# Patient Record
Sex: MALE | Race: WHITE | Employment: UNEMPLOYED | URBAN - METROPOLITAN AREA
[De-identification: names, ages, dates, MRNs, and addresses within clinical notes are randomized per-mention and may not be internally consistent; named-entity substitution may affect disease eponyms.]

---

## 2020-08-11 ENCOUNTER — OFFICE VISIT (OUTPATIENT)
Dept: CARDIOLOGY CLINIC | Age: 60
End: 2020-08-11
Payer: MEDICARE

## 2020-08-11 VITALS
HEART RATE: 74 BPM | SYSTOLIC BLOOD PRESSURE: 128 MMHG | RESPIRATION RATE: 26 BRPM | OXYGEN SATURATION: 99 % | DIASTOLIC BLOOD PRESSURE: 84 MMHG

## 2020-08-11 PROBLEM — R93.1 ABNORMAL ECHOCARDIOGRAM: Status: ACTIVE | Noted: 2020-08-11

## 2020-08-11 PROCEDURE — 99214 OFFICE O/P EST MOD 30 MIN: CPT | Performed by: INTERNAL MEDICINE

## 2020-08-11 PROCEDURE — G8421 BMI NOT CALCULATED: HCPCS | Performed by: INTERNAL MEDICINE

## 2020-08-11 PROCEDURE — G8427 DOCREV CUR MEDS BY ELIG CLIN: HCPCS | Performed by: INTERNAL MEDICINE

## 2020-08-11 RX ORDER — FUROSEMIDE 20 MG/1
40 TABLET ORAL 2 TIMES DAILY
COMMUNITY

## 2020-08-11 RX ORDER — SULFAMETHOXAZOLE AND TRIMETHOPRIM 800; 160 MG/1; MG/1
TABLET ORAL
COMMUNITY
Start: 2020-04-03 | End: 2020-10-01

## 2020-08-11 RX ORDER — PREDNISONE 20 MG/1
TABLET ORAL
COMMUNITY
Start: 2020-04-09 | End: 2020-09-12

## 2020-08-11 RX ORDER — FLUOXETINE HYDROCHLORIDE 40 MG/1
CAPSULE ORAL
COMMUNITY
Start: 2020-06-09

## 2020-08-11 RX ORDER — LOSARTAN POTASSIUM 100 MG/1
100 TABLET ORAL DAILY
COMMUNITY

## 2020-08-11 RX ORDER — ATORVASTATIN CALCIUM 20 MG/1
20 TABLET, FILM COATED ORAL DAILY
COMMUNITY
Start: 2020-06-29

## 2020-08-11 RX ORDER — METOPROLOL SUCCINATE 50 MG/1
TABLET, EXTENDED RELEASE ORAL
COMMUNITY
Start: 2020-06-29 | End: 2020-10-01

## 2020-08-11 RX ORDER — FOLIC ACID 1 MG/1
1000 TABLET ORAL DAILY
COMMUNITY
Start: 2019-08-09

## 2020-08-11 RX ORDER — METOPROLOL TARTRATE 50 MG/1
50 TABLET, FILM COATED ORAL 2 TIMES DAILY
COMMUNITY

## 2020-08-11 RX ORDER — SPIRONOLACTONE 50 MG/1
50 TABLET, FILM COATED ORAL DAILY
COMMUNITY

## 2020-08-11 RX ORDER — ASPIRIN 81 MG/1
81 TABLET ORAL DAILY
COMMUNITY
Start: 2019-08-09

## 2020-08-11 RX ORDER — FLUOXETINE HYDROCHLORIDE 20 MG/1
CAPSULE ORAL
COMMUNITY
Start: 2020-06-09

## 2020-08-11 ASSESSMENT — ENCOUNTER SYMPTOMS
COLOR CHANGE: 0
BLOOD IN STOOL: 0
SHORTNESS OF BREATH: 1
APNEA: 0
CHEST TIGHTNESS: 0
ABDOMINAL DISTENTION: 0
NAUSEA: 0
DIARRHEA: 0
WHEEZING: 1
VOMITING: 0

## 2020-08-11 NOTE — PROGRESS NOTES
[facility] CARDIOLOGY OFFICE CONSULT        Patient: Eli Hamilton  YOB: 1960  MRN: [id number]    Chief Complaint:  Chief Complaint   Patient presents with   Julieta Hurd Cardiologist     Referred by Dr. Gaby Covarrubias Abnormal Test Results     ABN ECHO       Subjective/HPI:   8/11/2020: Patient presents today for shortness of breath on minimal exertion. He is a resident at [address] [place] [address]. Has a longstanding history of alcohol use. Quit 6 months ago. Has a history of varices. Also recent echo showed EF fraction about 25% he has severe pulmonary hypertension. Was a smoker also until 6 months ago. He is functional class III CHF. He is on O2 24/7 but noncompliant. Still smokes 4/ 6 cigarettes a day. Denies alcohol. Denies illicit drug abuse. Physical examination consistent with congestive heart failure. I told him his most likely ED he has alcoholic cardiomyopathy. He sees  in [place]. Currently under [facility]. I am going to do the following. He refuses to be admitted. I am going to start him on Lasix 40 mg p.o. twice daily, Aldactone 25 mg a day, Lopressor 25 mg p.o. twice daily, losartan 50 mg daily. Needs to be on 3 L nasal oxygen 24/7. I would like him to be on Entresto but cannot do it right now. His renal function was normal.  Because he does not want to be admitted and wants to stay in [place]. I will see him next week the [place] office. Will do labs at that time. He has been on steroids for the last 6 months for \" pulmonary fibrosis\"            No past medical history on file. Past Surgical History:   Procedure Laterality Date    CARDIAC CATHETERIZATION  03/06/2019    DR. VARNER       No family history on file.     Social History     Socioeconomic History    Marital status: None     Spouse name: None    Number of children: None    Years of education: None    Highest education level: None   Occupational History    None   Social Needs    Financial resource strain: None    Food insecurity     Worry: None     Inability: None    Transportation needs     Medical: None     Non-medical: None   Tobacco Use    Smoking status: Current Some Day Smoker    Smokeless tobacco: Never Used    Tobacco comment: 3 small puff of cig a day   Substance and Sexual Activity    Alcohol use: Not Currently     Comment: Since March 2020    Drug use: Never    Sexual activity: Not Currently   Lifestyle    Physical activity     Days per week: None     Minutes per session: None    Stress: None   Relationships    Social connections     Talks on phone: None     Gets together: None     Attends Mandaen service: None     Active member of club or organization: None     Attends meetings of clubs or organizations: None     Relationship status: None    Intimate partner violence     Fear of current or ex partner: None     Emotionally abused: None     Physically abused: None     Forced sexual activity: None   Other Topics Concern    None   Social History Narrative    None       No Known Allergies    Current Outpatient Medications   Medication Sig Dispense Refill    aspirin 81 MG EC tablet Take 81 mg by mouth daily      atorvastatin (LIPITOR) 80 MG tablet       sulfamethoxazole-trimethoprim (BACTRIM DS;SEPTRA DS) 800-160 MG per tablet       FLUoxetine (PROZAC) 40 MG capsule TAKE 1 CAPSULE BY MOUTH ONCE DAILY      folic acid (FOLVITE) 1 MG tablet Take 1,000 mcg by mouth daily      FLUoxetine (PROZAC) 20 MG capsule TAKE 1 CAPSULE BY MOUTH ONCE DAILY      metoprolol succinate (TOPROL XL) 50 MG extended release tablet       predniSONE (DELTASONE) 20 MG tablet       THIAMINE MONONITRATE PO Take 1 tablet by mouth daily      furosemide (LASIX) 20 MG tablet Take 20 mg by mouth 2 times daily      spironolactone (ALDACTONE) 25 MG tablet Take 25 mg by mouth daily      metoprolol tartrate (LOPRESSOR) 25 MG tablet Take 25 mg by mouth 2 times daily      losartan (COZAAR) 50 MG tablet Take 50 mg by mouth daily       No current facility-administered medications for this visit. Review of Systems:   Review of Systems   Constitutional: Negative for appetite change, diaphoresis and fatigue. HENT: Negative for nosebleeds. Respiratory: Positive for shortness of breath and wheezing. Negative for apnea and chest tightness. Cardiovascular: Negative for chest pain, palpitations and leg swelling. Gastrointestinal: Negative for abdominal distention, blood in stool, diarrhea, nausea and vomiting. Musculoskeletal: Negative for myalgias, neck pain and neck stiffness. Skin: Negative for color change, pallor, rash and wound. Neurological: Negative for dizziness, seizures, syncope, weakness, light-headedness, numbness and headaches. Hematological: Does not bruise/bleed easily. Psychiatric/Behavioral: Negative for agitation, behavioral problems and confusion. The patient is not nervous/anxious and is not hyperactive. All other systems reviewed and are negative. Review of System is negative except for as mentioned above. Physical Examination:    /84 (Site: Right Upper Arm, Position: Sitting, Cuff Size: Medium Adult)   Pulse 74   Resp 26   SpO2 99%    Physical Exam   Constitutional: No distress. Chronically ill looking   HENT:   Nose: Nose normal.   Mouth/Throat: Dentition is normal. Oropharynx is clear. Eyes: Pupils are equal, round, and reactive to light. Conjunctivae are normal.   Neck: Normal range of motion and thyroid normal. Neck supple. Cardiovascular: Regular rhythm, S1 normal, S2 normal, intact distal pulses and normal pulses. Tachycardia present. PMI is not displaced. Exam reveals gallop, S3 and S4. No murmur heard. Pulmonary/Chest: Increased effort noted. He has bibasilar rales and scattered wheezes. He exhibits no tenderness. Abdominal: Soft. Bowel sounds are normal. He exhibits no distension and no mass.  There is no splenomegaly or hepatomegaly. There is no abdominal tenderness. No hernia. Neurological: He is alert and oriented to person, place, and time. He has normal motor skills. Gait normal.   Skin: Skin is warm and dry. No cyanosis. No jaundice. Nails show no clubbing. There is no problem list on file for this patient. No orders of the defined types were placed in this encounter. No orders of the defined types were placed in this encounter. Assessment:    There are no diagnoses linked to this encounter. Plan:   Patient to start Lasix 40mg Twice a day, Aldactone 25mg Daily, Lopressor 25mg Twice a day & Losartan 50mg Daily    Stay on same medications. See me in 1 week. This note was partially generated using Dragon voice recognition system, and there may be some incorrect words, spellings, punctuation that were not noticed in checking the note before saving.         Electronically signed by Haydee Valerio MD on 8/11/2020 at 5:10 PM

## 2020-08-18 ENCOUNTER — TELEPHONE (OUTPATIENT)
Dept: CARDIOLOGY CLINIC | Age: 60
End: 2020-08-18

## 2020-08-18 ENCOUNTER — OFFICE VISIT (OUTPATIENT)
Dept: CARDIOLOGY CLINIC | Age: 60
End: 2020-08-18
Payer: MEDICARE

## 2020-08-18 VITALS
RESPIRATION RATE: 22 BRPM | HEART RATE: 80 BPM | SYSTOLIC BLOOD PRESSURE: 122 MMHG | DIASTOLIC BLOOD PRESSURE: 82 MMHG | OXYGEN SATURATION: 99 %

## 2020-08-18 PROCEDURE — G8421 BMI NOT CALCULATED: HCPCS | Performed by: INTERNAL MEDICINE

## 2020-08-18 PROCEDURE — 99214 OFFICE O/P EST MOD 30 MIN: CPT | Performed by: INTERNAL MEDICINE

## 2020-08-18 PROCEDURE — 4004F PT TOBACCO SCREEN RCVD TLK: CPT | Performed by: INTERNAL MEDICINE

## 2020-08-18 PROCEDURE — G8427 DOCREV CUR MEDS BY ELIG CLIN: HCPCS | Performed by: INTERNAL MEDICINE

## 2020-08-18 PROCEDURE — 3017F COLORECTAL CA SCREEN DOC REV: CPT | Performed by: INTERNAL MEDICINE

## 2020-08-18 ASSESSMENT — ENCOUNTER SYMPTOMS
SHORTNESS OF BREATH: 0
BLOOD IN STOOL: 0
COLOR CHANGE: 0
VOMITING: 0
NAUSEA: 0
CHEST TIGHTNESS: 0
ABDOMINAL DISTENTION: 0
DIARRHEA: 0
APNEA: 0

## 2020-08-18 NOTE — TELEPHONE ENCOUNTER
Please call Anjali Mullins at the [facility] to discuss Losartan and Lipitor medication changes. States he was taking Losartan 50mg and note states to increase to 200mg and also lipitor order states to decrease to 20mg and he is currently taking 20mg. Medication list scanned in shows lipitor 80mg. Needs clarification.

## 2020-08-18 NOTE — PROGRESS NOTES
[facility] CARDIOLOGY OFFICE FOLLOW-UP      Patient: Jacki Munguia  YOB: 1960  MRN: [id number]    Chief Complaint:  Chief Complaint   Patient presents with    Follow-up     1 week f/u    Abnormal Test Results     ABN ECHO - seen last week 8/11/2020    Medication Check     Lasix 20mg Daily / Aldactone 25mg Daily / Lopressor 25mg Daily / Cozaar 50mg Daily         Subjective/HPI:  8/18/2020: Patient presents today for cardiomyopathy severe pulmonary hypertension. He is in assisted living. Heart rate is better. He is more shaky. Has history of alcohol use with cirrhosis of the liver. He is on metoprolol 25 twice daily will be increased to 50 twice daily, leave the Lasix at 40 twice daily, Aldactone will be increased to 50. And increase the losartan 200. See me next week again he does not want to get admitted. Recent labs showed hemoglobin A1c of 6.5 BUN and creatinine is stable       8/11/2020: Patient presents today for shortness of breath on minimal exertion. He is a resident at [address] [place] [address]. Has a longstanding history of alcohol use. Quit 6 months ago. Has a history of varices. Also recent echo showed EF fraction about 25% he has severe pulmonary hypertension. Was a smoker also until 6 months ago. He is functional class III CHF. He is on O2 24/7 but noncompliant. Still smokes 4/ 6 cigarettes a day. Denies alcohol. Denies illicit drug abuse. Physical examination consistent with congestive heart failure. I told him his most likely ED he has alcoholic cardiomyopathy. He sees  in [place]. [facility] I am going to do the following. He refuses to be admitted. I am going to start him on Lasix 40 mg p.o. twice daily, Aldactone 25 mg a day, Lopressor 25 mg p.o. twice daily, losartan 50 mg daily. Needs to be on 3 L nasal oxygen 24/7. I would like him to be on Entresto but cannot do it right now.   His renal function was normal.  Because he does not want to be admitted and wants to stay in [place]. I will see him next week the [place] office. Will do labs at that time. He has been on steroids for the last 6 months for \" pulmonary fibrosis\"        No past medical history on file. Past Surgical History:   Procedure Laterality Date    CARDIAC CATHETERIZATION  03/06/2019    DR. VARNER       No family history on file.     Social History     Socioeconomic History    Marital status: None     Spouse name: None    Number of children: None    Years of education: None    Highest education level: None   Occupational History    None   Social Needs    Financial resource strain: None    Food insecurity     Worry: None     Inability: None    Transportation needs     Medical: None     Non-medical: None   Tobacco Use    Smoking status: Current Some Day Smoker    Smokeless tobacco: Never Used    Tobacco comment: 3 small puff of cig a day   Substance and Sexual Activity    Alcohol use: Not Currently     Comment: Since March 2020    Drug use: Never    Sexual activity: Not Currently   Lifestyle    Physical activity     Days per week: None     Minutes per session: None    Stress: None   Relationships    Social connections     Talks on phone: None     Gets together: None     Attends Scientologist service: None     Active member of club or organization: None     Attends meetings of clubs or organizations: None     Relationship status: None    Intimate partner violence     Fear of current or ex partner: None     Emotionally abused: None     Physically abused: None     Forced sexual activity: None   Other Topics Concern    None   Social History Narrative    None       No Known Allergies    Current Outpatient Medications   Medication Sig Dispense Refill    aspirin 81 MG EC tablet Take 81 mg by mouth daily      atorvastatin (LIPITOR) 80 MG tablet       sulfamethoxazole-trimethoprim (BACTRIM DS;SEPTRA DS) 800-160 MG per tablet       FLUoxetine (PROZAC) 40 MG capsule TAKE 1 CAPSULE BY MOUTH ONCE DAILY      folic acid (FOLVITE) 1 MG tablet Take 1,000 mcg by mouth daily      FLUoxetine (PROZAC) 20 MG capsule TAKE 1 CAPSULE BY MOUTH ONCE DAILY      metoprolol succinate (TOPROL XL) 50 MG extended release tablet       predniSONE (DELTASONE) 20 MG tablet       THIAMINE MONONITRATE PO Take 1 tablet by mouth daily      furosemide (LASIX) 20 MG tablet Take 40 mg by mouth 2 times daily       spironolactone (ALDACTONE) 25 MG tablet Take 25 mg by mouth daily      metoprolol tartrate (LOPRESSOR) 25 MG tablet Take 25 mg by mouth 2 times daily      losartan (COZAAR) 50 MG tablet Take 50 mg by mouth daily       No current facility-administered medications for this visit. Review of Systems:   Review of Systems   Constitutional: Negative for appetite change, diaphoresis and fatigue. HENT: Negative for nosebleeds. Respiratory: Negative for apnea, chest tightness and shortness of breath. Cardiovascular: Negative for chest pain, palpitations and leg swelling. Gastrointestinal: Negative for abdominal distention, blood in stool, diarrhea, nausea and vomiting. Musculoskeletal: Negative for myalgias, neck pain and neck stiffness. Skin: Negative for color change, pallor, rash and wound. Neurological: Negative for dizziness, seizures, syncope, weakness, light-headedness, numbness and headaches. Hematological: Does not bruise/bleed easily. Psychiatric/Behavioral: Negative for agitation, behavioral problems and confusion. The patient is not nervous/anxious and is not hyperactive. All other systems reviewed and are negative. Review of System is negative except for as mentioned above. Physical Examination:    /82 (Site: Left Upper Arm, Position: Sitting, Cuff Size: Medium Adult)   Pulse 80   Resp 22   SpO2 99%    Physical Exam   Constitutional: He appears healthy. No distress.    HENT:   Nose: Nose normal.   Mouth/Throat: Dentition is normal. Oropharynx is clear. Eyes: Pupils are equal, round, and reactive to light. Conjunctivae are normal.   Neck: Normal range of motion and thyroid normal. Neck supple. Cardiovascular: Regular rhythm, S1 normal, S2 normal, normal heart sounds, intact distal pulses and normal pulses. PMI is not displaced. No murmur heard. Pulmonary/Chest: He has no wheezes. He has no rales. He exhibits no tenderness. Abdominal: Soft. Bowel sounds are normal. He exhibits no distension and no mass. There is no splenomegaly or hepatomegaly. There is no abdominal tenderness. No hernia. Neurological: He is alert and oriented to person, place, and time. He has normal motor skills. Gait normal.   Skin: Skin is warm and dry. No cyanosis. No jaundice. Nails show no clubbing. Patient Active Problem List   Diagnosis    Abnormal echocardiogram           No orders of the defined types were placed in this encounter. No orders of the defined types were placed in this encounter. Assessment:    1. Cardiomyopathy, unspecified type ([address]    2. Pulmonary hypertension ([address]         Plan:     Stay on same medications. See me in 1 week. This note was partially generated using Dragon voice recognition system, and there may be some incorrect words, spellings, punctuation that were not noticed in checking the note before saving.         Electronically signed by Kaye Collazo MD on 8/19/2020 at 8:17 AM

## 2020-08-19 ENCOUNTER — TELEPHONE (OUTPATIENT)
Dept: CARDIOLOGY CLINIC | Age: 60
End: 2020-08-19

## 2020-08-19 NOTE — TELEPHONE ENCOUNTER
LENORE IN [place]---ALREADY ON LIPITOR 20MG --OUR MED LIST SAYS 80 MG.   PLEASE CLARIFY  CALL RN @ [employer]  [phone]

## 2020-08-20 NOTE — TELEPHONE ENCOUNTER
CALLED [facility] - SPOKE WITH NURSE IN CHARGE - VERIFIED MEDICATION DOSAGES PER DR. Ken Mcmillan

## 2020-09-08 ENCOUNTER — OFFICE VISIT (OUTPATIENT)
Dept: CARDIOLOGY CLINIC | Age: 60
End: 2020-09-08
Payer: MEDICARE

## 2020-09-08 VITALS
OXYGEN SATURATION: 99 % | DIASTOLIC BLOOD PRESSURE: 68 MMHG | SYSTOLIC BLOOD PRESSURE: 98 MMHG | RESPIRATION RATE: 22 BRPM | HEART RATE: 78 BPM

## 2020-09-08 PROCEDURE — G8427 DOCREV CUR MEDS BY ELIG CLIN: HCPCS | Performed by: INTERNAL MEDICINE

## 2020-09-08 PROCEDURE — 99214 OFFICE O/P EST MOD 30 MIN: CPT | Performed by: INTERNAL MEDICINE

## 2020-09-08 PROCEDURE — G8421 BMI NOT CALCULATED: HCPCS | Performed by: INTERNAL MEDICINE

## 2020-09-08 PROCEDURE — 4004F PT TOBACCO SCREEN RCVD TLK: CPT | Performed by: INTERNAL MEDICINE

## 2020-09-08 PROCEDURE — 3017F COLORECTAL CA SCREEN DOC REV: CPT | Performed by: INTERNAL MEDICINE

## 2020-09-08 ASSESSMENT — ENCOUNTER SYMPTOMS
SHORTNESS OF BREATH: 0
NAUSEA: 0
BLOOD IN STOOL: 0
CHEST TIGHTNESS: 0
WHEEZING: 0
DIARRHEA: 0
VOMITING: 0
APNEA: 0
COUGH: 0
STRIDOR: 0

## 2020-09-08 NOTE — PROGRESS NOTES
[facility] CARDIOLOGY OFFICE FOLLOW-UP      Patient: Meghan Stephens  YOB: 1960  MRN: [id number]    Chief Complaint:  Chief Complaint   Patient presents with    Follow-up     1 week f/u    Cardiomyopathy         Subjective/HPI:  9/8/2020: Patient presents today for follow-up of cardiomyopathy. Clinically he appears to be better. He refuses to accept that he does not want to be admitted. Occasional chest pain which may be angina. I am going to increase the Lopressor to 50 twice daily with parameters for blood pressure and heart rate. We will see him in the month. No definite ankle edema. He has functional class III heart failure symptoms. Again refuses admission             No past medical history on file. Past Surgical History:   Procedure Laterality Date    CARDIAC CATHETERIZATION  03/06/2019    DR. VARNER       No family history on file.     Social History     Socioeconomic History    Marital status: Single     Spouse name: None    Number of children: None    Years of education: None    Highest education level: None   Occupational History    None   Social Needs    Financial resource strain: None    Food insecurity     Worry: None     Inability: None    Transportation needs     Medical: None     Non-medical: None   Tobacco Use    Smoking status: Current Some Day Smoker    Smokeless tobacco: Never Used    Tobacco comment: 3 small puff of cig a day   Substance and Sexual Activity    Alcohol use: Not Currently     Comment: Since March 2020    Drug use: Never    Sexual activity: Not Currently   Lifestyle    Physical activity     Days per week: None     Minutes per session: None    Stress: None   Relationships    Social connections     Talks on phone: None     Gets together: None     Attends Anglican service: None     Active member of club or organization: None     Attends meetings of clubs or organizations: None     Relationship status: None    Intimate partner violence All other systems reviewed and are negative. Review of System is negative except for as mentioned above. Physical Examination:    BP 98/68 (Site: Left Upper Arm, Position: Sitting, Cuff Size: Medium Adult)   Pulse 78   Resp 22   SpO2 99%    Physical Exam   Constitutional: He appears healthy. No distress. HENT:   Nose: Nose normal.   Mouth/Throat: Dentition is normal. Oropharynx is clear. Eyes: Pupils are equal, round, and reactive to light. Conjunctivae are normal.   Neck: Normal range of motion and thyroid normal. Neck supple. Cardiovascular: Regular rhythm, S1 normal, S2 normal, intact distal pulses and normal pulses. PMI is not displaced. Exam reveals gallop. No murmur heard. Pulmonary/Chest: He has no wheezes. He has no rales. He exhibits no tenderness. Abdominal: Soft. Bowel sounds are normal. He exhibits no distension and no mass. There is no splenomegaly or hepatomegaly. There is no abdominal tenderness. No hernia. Neurological: He is alert and oriented to person, place, and time. He has normal motor skills. Gait normal.   Skin: Skin is warm and dry. No cyanosis. No jaundice. Nails show no clubbing. Patient Active Problem List   Diagnosis    Abnormal echocardiogram           No orders of the defined types were placed in this encounter. No orders of the defined types were placed in this encounter. Assessment:    1. Cardiomyopathy, unspecified type ([address]    2. Abnormal echocardiogram    3. H/O ETOH abuse         Plan:   Patient to have Lopressor 50mg Twice a day (orders sent to nursing home)    Stay on same medications. See me in 1 month. This note was partially generated using Dragon voice recognition system, and there may be some incorrect words, spellings, punctuation that were not noticed in checking the note before saving.         Electronically signed by Xochilt Kaufman MD on 9/8/2020 at 3:07 PM

## 2020-09-12 ENCOUNTER — APPOINTMENT (OUTPATIENT)
Dept: GENERAL RADIOLOGY | Age: 60
End: 2020-09-12
Payer: MEDICARE

## 2020-09-12 ENCOUNTER — HOSPITAL ENCOUNTER (EMERGENCY)
Age: 60
Discharge: HOME | End: 2020-09-12
Payer: MEDICARE

## 2020-09-12 VITALS
BODY MASS INDEX: 31.52 KG/M2 | WEIGHT: 208 LBS | HEIGHT: 68 IN | TEMPERATURE: 98.2 F | RESPIRATION RATE: 20 BRPM | SYSTOLIC BLOOD PRESSURE: 106 MMHG | HEART RATE: 83 BPM | OXYGEN SATURATION: 97 % | DIASTOLIC BLOOD PRESSURE: 68 MMHG

## 2020-09-12 LAB
ALBUMIN SERPL-MCNC: 3.9 G/DL (ref 3.5–4.6)
ALP BLD-CCNC: 58 U/L (ref 35–104)
ALT SERPL-CCNC: 21 U/L (ref 0–41)
ANION GAP SERPL CALCULATED.3IONS-SCNC: 9 MEQ/L (ref 9–15)
AST SERPL-CCNC: 22 U/L (ref 0–40)
BASOPHILS ABSOLUTE: 0.1 K/UL (ref 0–0.2)
BASOPHILS RELATIVE PERCENT: 0.6 %
BILIRUB SERPL-MCNC: 0.4 MG/DL (ref 0.2–0.7)
BUN BLDV-MCNC: 50 MG/DL (ref 8–23)
CALCIUM SERPL-MCNC: 9.9 MG/DL (ref 8.5–9.9)
CHLORIDE BLD-SCNC: 94 MEQ/L (ref 95–107)
CO2: 30 MEQ/L (ref 20–31)
CREAT SERPL-MCNC: 1.76 MG/DL (ref 0.7–1.2)
EKG ATRIAL RATE: 79 BPM
EKG P AXIS: 45 DEGREES
EKG P-R INTERVAL: 128 MS
EKG Q-T INTERVAL: 378 MS
EKG QRS DURATION: 96 MS
EKG QTC CALCULATION (BAZETT): 433 MS
EKG R AXIS: 71 DEGREES
EKG T AXIS: 23 DEGREES
EKG VENTRICULAR RATE: 79 BPM
EOSINOPHILS ABSOLUTE: 0.7 K/UL (ref 0–0.7)
EOSINOPHILS RELATIVE PERCENT: 5.9 %
GFR AFRICAN AMERICAN: 47.9
GFR NON-AFRICAN AMERICAN: 39.6
GLOBULIN: 3.5 G/DL (ref 2.3–3.5)
GLUCOSE BLD-MCNC: 126 MG/DL (ref 70–99)
HCT VFR BLD CALC: 36.2 % (ref 42–52)
HEMOGLOBIN: 12.7 G/DL (ref 14–18)
INR BLD: 1
LYMPHOCYTES ABSOLUTE: 2.3 K/UL (ref 1–4.8)
LYMPHOCYTES RELATIVE PERCENT: 20.2 %
MCH RBC QN AUTO: 31.9 PG (ref 27–31.3)
MCHC RBC AUTO-ENTMCNC: 35 % (ref 33–37)
MCV RBC AUTO: 91 FL (ref 80–100)
MONOCYTES ABSOLUTE: 0.9 K/UL (ref 0.2–0.8)
MONOCYTES RELATIVE PERCENT: 7.6 %
NEUTROPHILS ABSOLUTE: 7.4 K/UL (ref 1.4–6.5)
NEUTROPHILS RELATIVE PERCENT: 65.7 %
PDW BLD-RTO: 12.8 % (ref 11.5–14.5)
PLATELET # BLD: 221 K/UL (ref 130–400)
POTASSIUM SERPL-SCNC: 4.8 MEQ/L (ref 3.4–4.9)
PRO-BNP: 60 PG/ML
PROTHROMBIN TIME: 13.2 SEC (ref 12.3–14.9)
RBC # BLD: 3.98 M/UL (ref 4.7–6.1)
SODIUM BLD-SCNC: 133 MEQ/L (ref 135–144)
TOTAL PROTEIN: 7.4 G/DL (ref 6.3–8)
TROPONIN: <0.01 NG/ML (ref 0–0.01)
WBC # BLD: 11.2 K/UL (ref 4.8–10.8)

## 2020-09-12 PROCEDURE — 80053 COMPREHEN METABOLIC PANEL: CPT

## 2020-09-12 PROCEDURE — 93005 ELECTROCARDIOGRAM TRACING: CPT | Performed by: PERSONAL EMERGENCY RESPONSE ATTENDANT

## 2020-09-12 PROCEDURE — 83880 ASSAY OF NATRIURETIC PEPTIDE: CPT

## 2020-09-12 PROCEDURE — 84484 ASSAY OF TROPONIN QUANT: CPT

## 2020-09-12 PROCEDURE — 85025 COMPLETE CBC W/AUTO DIFF WBC: CPT

## 2020-09-12 PROCEDURE — 85610 PROTHROMBIN TIME: CPT

## 2020-09-12 PROCEDURE — 71045 X-RAY EXAM CHEST 1 VIEW: CPT

## 2020-09-12 PROCEDURE — 99285 EMERGENCY DEPT VISIT HI MDM: CPT

## 2020-09-12 RX ORDER — PREDNISONE 10 MG/1
TABLET ORAL
Qty: 30 TABLET | Refills: 0 | Status: SHIPPED | OUTPATIENT
Start: 2020-09-12 | End: 2020-09-22

## 2020-09-12 ASSESSMENT — ENCOUNTER SYMPTOMS
VOMITING: 0
BLOOD IN STOOL: 0
RHINORRHEA: 0
DIARRHEA: 0
SORE THROAT: 0
COUGH: 0
ABDOMINAL PAIN: 0
NAUSEA: 0
SHORTNESS OF BREATH: 1
COLOR CHANGE: 0

## 2020-09-12 NOTE — ED NOTES
Bed: 08  Expected date: 9/12/20  Expected time: 6:30 PM  Means of arrival: SCAD  Comments:  [id number] [address] coming in for code status change     Karol Munoz RN  09/12/20 [id number]

## 2020-09-12 NOTE — ED PROVIDER NOTES
[address] [facility] ED  eMERGENCY dEPARTMENT eNCOUnter      Pt Name: Misael Reyes  MRN: [id number]  [place] 1960  Date of evaluation: 9/12/2020  Provider: DILLON Westbrook      HISTORY OF PRESENT ILLNESS    Misael Reyes is a 61 y.o. male with PMHx of adrenal tumor, alcohol abuse, anemia, anxiety, BPH, depression, esophageal varices, fatty liver, alcoholic cardiomyopathy with ejection fraction 25%, hyperlipidemia, hypertension, peptic ulcer, pulmonary fibrosis 6 L continuous oxygen, pancytopenia, TIA presents to the emergency department with SOB and code status change. Patient was at [address] [place] [address] and discharged July 21. He was diagnosed with pulmonary fibrosis at this time. He went to [facility] and he said he didn't want to be on life support and they made him a DNR-CC. Patient states he never discussed options with him. He states now he cannot get assistance with his cardiomyopathy d/t his code status. Patient states for the past 3 months whenever standing up or with exertion he gets short of breath and lightheaded with near syncope. He denies orthopnea. He denies fevers, cough, chest pain (most recent chest pain was last week for 1 day), leg swelling, abdominal pain, nausea, vomiting. Patient smokes 2 cigarettes a day, stopped drinking 6+ months ago. HPI    Nursing Notes were reviewed. REVIEW OF SYSTEMS       Review of Systems   Constitutional: Negative for appetite change, chills and fever. HENT: Negative for congestion, rhinorrhea and sore throat. Respiratory: Positive for shortness of breath. Negative for cough. Cardiovascular: Negative for chest pain. Gastrointestinal: Negative for abdominal pain, blood in stool, diarrhea, nausea and vomiting. Genitourinary: Negative for difficulty urinating. Musculoskeletal: Negative for neck stiffness. Skin: Negative for color change and rash.    Neurological: Negative for dizziness, syncope, weakness, light-headedness, numbness and headaches. All other systems reviewed and are negative. PAST MEDICAL HISTORY     Past Medical History:   Diagnosis Date    Acidosis     Adrenal tumor     Alcohol abuse     Anemia     Anxiety     At high risk for self harm     Atherosclerotic heart disease     BPH (benign prostatic hyperplasia)     Depression     Esophageal varices (HCC)     Fatty liver     Heart failure (HCC)     Hyperlipidemia     Hypertension     Peptic ulcer     Pulmonary edema     Pulmonary fibrosis (HCC)     Thrombocytopenia (HCC)     TIA (transient ischemic attack)          SURGICAL HISTORY       Past Surgical History:   Procedure Laterality Date    APPENDECTOMY      CARDIAC CATHETERIZATION  03/06/2019    DR. VARNER    HERNIA REPAIR           CURRENT MEDICATIONS       Previous Medications    ASPIRIN 81 MG EC TABLET    Take 81 mg by mouth daily    ATORVASTATIN (LIPITOR) 80 MG TABLET        FLUOXETINE (PROZAC) 20 MG CAPSULE    TAKE 1 CAPSULE BY MOUTH ONCE DAILY    FLUOXETINE (PROZAC) 40 MG CAPSULE    TAKE 1 CAPSULE BY MOUTH ONCE DAILY    FOLIC ACID (FOLVITE) 1 MG TABLET    Take 1,000 mcg by mouth daily    FUROSEMIDE (LASIX) 20 MG TABLET    Take 40 mg by mouth 2 times daily     LOSARTAN (COZAAR) 50 MG TABLET    Take 50 mg by mouth daily    METOPROLOL SUCCINATE (TOPROL XL) 50 MG EXTENDED RELEASE TABLET        METOPROLOL TARTRATE (LOPRESSOR) 25 MG TABLET    Take 25 mg by mouth 2 times daily    SPIRONOLACTONE (ALDACTONE) 25 MG TABLET    Take 25 mg by mouth daily    SULFAMETHOXAZOLE-TRIMETHOPRIM (BACTRIM DS;SEPTRA DS) 800-160 MG PER TABLET        THIAMINE MONONITRATE PO    Take 1 tablet by mouth daily       ALLERGIES     Patient has no known allergies. FAMILY HISTORY     History reviewed. No pertinent family history.        SOCIAL HISTORY       Social History     Socioeconomic History    Marital status: Single     Spouse name: None    Number of children: None    Years of education: None    Highest education level: None   Occupational History    None   Social Needs    Financial resource strain: None    Food insecurity     Worry: None     Inability: None    Transportation needs     Medical: None     Non-medical: None   Tobacco Use    Smoking status: Current Some Day Smoker    Smokeless tobacco: Never Used    Tobacco comment: 3 small puff of cig a day   Substance and Sexual Activity    Alcohol use: Not Currently     Comment: Since March 2020    Drug use: Never    Sexual activity: Not Currently   Lifestyle    Physical activity     Days per week: None     Minutes per session: None    Stress: None   Relationships    Social connections     Talks on phone: None     Gets together: None     Attends Voodoo service: None     Active member of club or organization: None     Attends meetings of clubs or organizations: None     Relationship status: None    Intimate partner violence     Fear of current or ex partner: None     Emotionally abused: None     Physically abused: None     Forced sexual activity: None   Other Topics Concern    None   Social History Narrative    None         PHYSICAL EXAM         ED Triage Vitals   BP Temp Temp src Pulse Resp SpO2 Height Weight   09/12/20 1821 09/12/20 1812 -- 09/12/20 1821 09/12/20 1812 09/12/20 1812 09/12/20 1812 09/12/20 1812   106/68 98.2 °F (36.8 °C)  83 20 100 % 5' 8\" (1.727 m) 208 lb (94.3 kg)       Physical Exam  Constitutional:       Appearance: He is well-developed. HENT:      Head: Normocephalic and atraumatic. Eyes:      Conjunctiva/sclera: Conjunctivae normal.      Pupils: Pupils are equal, round, and reactive to light. Neck:      Musculoskeletal: Normal range of motion and neck supple. Trachea: No tracheal deviation. Cardiovascular:      Heart sounds: Normal heart sounds. Pulmonary:      Effort: Pulmonary effort is normal. No respiratory distress. Breath sounds: No stridor. Rales present.       Comments: Rales in bilateral lower lobes, no labored respirations  Abdominal:      General: Bowel sounds are normal. There is no distension. Palpations: Abdomen is soft. There is no mass. Tenderness: There is no abdominal tenderness. There is no guarding or rebound. Musculoskeletal: Normal range of motion. Skin:     General: Skin is warm and dry. Capillary Refill: Capillary refill takes less than 2 seconds. Findings: No rash. Neurological:      Mental Status: He is alert and oriented to person, place, and time. Deep Tendon Reflexes: Reflexes are normal and symmetric. Psychiatric:         Behavior: Behavior normal.         Thought Content:  Thought content normal.         Judgment: Judgment normal.         DIAGNOSTIC RESULTS     EKG:All EKG's are interpreted by the Emergency Department Physician who either signs or Co-signs this chart in the absence of a cardiologist.    Normal sinus rhythm, rate 79, normal intervals, normal axis, no ST segment changes    RADIOLOGY:   Non-plain film images such as CT, Ultrasound and MRI are read by theradiologist. Plain radiographic images are visualized and preliminarily interpreted by the emergency physician with the below findings:    Interpretation per theRadiologist below, if available at the time of this note:    XR CHEST PORTABLE    (Results Pending)           LABS:  Labs Reviewed   CBC WITH AUTO DIFFERENTIAL - Abnormal; Notable for the following components:       Result Value    WBC 11.2 (*)     RBC 3.98 (*)     Hemoglobin 12.7 (*)     Hematocrit 36.2 (*)     MCH 31.9 (*)     Neutrophils Absolute 7.4 (*)     Monocytes Absolute 0.9 (*)     All other components within normal limits   COMPREHENSIVE METABOLIC PANEL - Abnormal; Notable for the following components:    Sodium 133 (*)     Chloride 94 (*)     Glucose 126 (*)     BUN 50 (*)     CREATININE 1.76 (*)     GFR Non- 39.6 (*)     GFR  47.9 (*)     All other components within normal limits   BRAIN NATRIURETIC PEPTIDE   TROPONIN   PROTIME-INR       All other labs were within normal range or not returned as of this dictation. EMERGENCY DEPARTMENT COURSE and DIFFERENTIAL DIAGNOSIS/MDM:   Vitals:    Vitals:    09/12/20 1812 09/12/20 1821   BP:  106/68   Pulse:  83   Resp: 20    Temp: 98.2 °F (36.8 °C)    SpO2: 100% 97%   Weight: 208 lb (94.3 kg)    Height: 5' 8\" (1.727 m)          MDM    Chest x-ray is concerning for pulmonary fibrosis appearance, no pleural effusions. No previous for comparison. Last cardiac cath March 6 2019 per review of chart. Sodium 133, chloride 94, BUN 50, creatinine 1.76, WBC 11.2, hemoglobin 12.7. Patient has been asymptomatic throughout his visit. He does have appointment with pulmonology and cardiology in October. He is hemodynamically stable, no hypoxia, no labored respirations. Crackles noted in bilateral bases, consistent with pulmonary fibrosis. No leg swelling, denies orthopnea. Patient's CODE STATUS was changed to DNR CCA. Patient is alert and oriented and competent to make decisions. He states he does not want CPR or intubation. Patient was given a copy of his CODE STATUS, and it was also sent to the nursing home. He will be placed on prednisone. Standard anticipatory guidance given to patient upon discharge. Have given them a specific time frame in which to follow-up and who to follow-up with. I have also advised them that they should return to the emergency department if they get worse, or not getting better or develop any new or concerning symptoms. Patient demonstrates understanding. CRITICAL CARE TIME   Total Critical Caretime was 0 minutes, excluding separately reportable procedures. There was a high probability of clinically significant/life threatening deterioration in the patient's condition which required my urgent intervention. Procedures    FINAL IMPRESSION      1.  Pulmonary fibrosis (HCC)          DISPOSITION/PLAN   DISPOSITION Decision To Discharge 09/12/2020 08:17:45 PM      PATIENT REFERRED TO:  Follow-up with specialist as scheduled            DISCHARGE MEDICATIONS:  New Prescriptions    PREDNISONE (DELTASONE) 10 MG TABLET    5 tabs po qam for 2 days then 4,3,2,1 tabs qam for 2 days each total of 10 days          (Please notethat portions of this note were completed with a voice recognition program.  Efforts were made to edit the dictations but occasionally words are mis-transcribed. )    DILLON Clarke (electronically signed)  Emergency Physician Assistant         Asim Caldwell, [id number]  09/12/20 2021

## 2020-09-12 NOTE — ED TRIAGE NOTES
Patient here states he wants to change his code status. States he wants treatment for his problems. But is not actively having any issues. Patient is on 4-6 lt of o2 continuously lungs diminished.

## 2020-09-13 NOTE — ED NOTES
Patient is given D/C instructions. He verbalized understanding of all instructions. [facility] picked patient up and transferred patient over to cot and left facility with no incident.       Itzel Hernandez RN  09/12/20 2246

## 2020-09-15 PROCEDURE — 93010 ELECTROCARDIOGRAM REPORT: CPT | Performed by: INTERNAL MEDICINE

## 2020-10-01 ENCOUNTER — APPOINTMENT (OUTPATIENT)
Dept: GENERAL RADIOLOGY | Age: 60
End: 2020-10-01
Payer: MEDICARE

## 2020-10-01 ENCOUNTER — HOSPITAL ENCOUNTER (INPATIENT)
Age: 60
LOS: 3 days | Discharge: ANOTHER HOSPITAL | DRG: 196 | End: 2020-10-04
Attending: INTERNAL MEDICINE | Admitting: INTERNAL MEDICINE
Payer: MEDICARE

## 2020-10-01 ENCOUNTER — HOSPITAL ENCOUNTER (EMERGENCY)
Age: 60
Discharge: ANOTHER HOSPITAL | End: 2020-10-01
Attending: EMERGENCY MEDICINE
Payer: MEDICARE

## 2020-10-01 ENCOUNTER — APPOINTMENT (OUTPATIENT)
Dept: ULTRASOUND IMAGING | Age: 60
DRG: 196 | End: 2020-10-01
Attending: INTERNAL MEDICINE
Payer: MEDICARE

## 2020-10-01 VITALS
BODY MASS INDEX: 31.05 KG/M2 | WEIGHT: 204.2 LBS | SYSTOLIC BLOOD PRESSURE: 106 MMHG | RESPIRATION RATE: 28 BRPM | OXYGEN SATURATION: 94 % | HEART RATE: 103 BPM | DIASTOLIC BLOOD PRESSURE: 67 MMHG | TEMPERATURE: 98.9 F

## 2020-10-01 PROBLEM — J96.00 ACUTE RESPIRATORY FAILURE (HCC): Status: ACTIVE | Noted: 2020-10-01

## 2020-10-01 LAB
ALBUMIN SERPL-MCNC: 4.2 G/DL (ref 3.5–4.6)
ALP BLD-CCNC: 60 U/L (ref 35–104)
ALT SERPL-CCNC: 21 U/L (ref 0–41)
ANION GAP SERPL CALCULATED.3IONS-SCNC: 16 MEQ/L (ref 9–15)
APTT: 36.6 SEC (ref 24.4–36.8)
AST SERPL-CCNC: 22 U/L (ref 0–40)
BASE EXCESS ARTERIAL: 4
BASOPHILS ABSOLUTE: 0 K/UL (ref 0–0.2)
BASOPHILS RELATIVE PERCENT: 0.5 %
BILIRUB SERPL-MCNC: 1 MG/DL (ref 0.2–0.7)
BILIRUBIN URINE: NEGATIVE
BLOOD, URINE: NEGATIVE
BUN BLDV-MCNC: 38 MG/DL (ref 8–23)
CALCIUM SERPL-MCNC: 10.8 MG/DL (ref 8.5–9.9)
CHLORIDE BLD-SCNC: 92 MEQ/L (ref 95–107)
CLARITY: CLEAR
CO2: 29 MEQ/L (ref 20–31)
COLOR: YELLOW
CREAT SERPL-MCNC: 2.05 MG/DL (ref 0.7–1.2)
CREATININE URINE: 202.2 MG/DL
D DIMER: 0.31 MG/L FEU (ref 0–0.5)
EKG ATRIAL RATE: 113 BPM
EKG P AXIS: 48 DEGREES
EKG P-R INTERVAL: 120 MS
EKG Q-T INTERVAL: 338 MS
EKG QRS DURATION: 88 MS
EKG QTC CALCULATION (BAZETT): 463 MS
EKG R AXIS: 68 DEGREES
EKG T AXIS: 34 DEGREES
EKG VENTRICULAR RATE: 113 BPM
EOSINOPHILS ABSOLUTE: 0.4 K/UL (ref 0–0.7)
EOSINOPHILS RELATIVE PERCENT: 4.3 %
GFR AFRICAN AMERICAN: 40.2
GFR NON-AFRICAN AMERICAN: 33.2
GLOBULIN: 4.2 G/DL (ref 2.3–3.5)
GLUCOSE BLD-MCNC: 136 MG/DL (ref 70–99)
GLUCOSE URINE: NEGATIVE MG/DL
HCO3 ARTERIAL: 27.8 MMOL/L (ref 21–29)
HCT VFR BLD CALC: 38.9 % (ref 42–52)
HEMOGLOBIN: 13.4 G/DL (ref 14–18)
INR BLD: 1
KETONES, URINE: 15 MG/DL
LACTIC ACID: 2.7 MMOL/L (ref 0.5–2.2)
LEUKOCYTE ESTERASE, URINE: NEGATIVE
LYMPHOCYTES ABSOLUTE: 2.3 K/UL (ref 1–4.8)
LYMPHOCYTES RELATIVE PERCENT: 22.2 %
MCH RBC QN AUTO: 31.9 PG (ref 27–31.3)
MCHC RBC AUTO-ENTMCNC: 34.5 % (ref 33–37)
MCV RBC AUTO: 92.6 FL (ref 80–100)
MONOCYTES ABSOLUTE: 0.8 K/UL (ref 0.2–0.8)
MONOCYTES RELATIVE PERCENT: 8.3 %
NEUTROPHILS ABSOLUTE: 6.6 K/UL (ref 1.4–6.5)
NEUTROPHILS RELATIVE PERCENT: 64.7 %
NITRITE, URINE: NEGATIVE
O2 SAT, ARTERIAL: 98 % (ref 93–100)
PCO2 ARTERIAL: 40 MM HG (ref 35–45)
PDW BLD-RTO: 13.1 % (ref 11.5–14.5)
PERFORMED ON: ABNORMAL
PH ARTERIAL: 7.46 (ref 7.35–7.45)
PH UA: 5 (ref 5–9)
PLATELET # BLD: 228 K/UL (ref 130–400)
PO2 ARTERIAL: 104 MM HG (ref 75–108)
POC FIO2: 50
POC SAMPLE TYPE: ABNORMAL
POTASSIUM REFLEX MAGNESIUM: 3.9 MEQ/L (ref 3.4–4.9)
PRO-BNP: 137 PG/ML
PROTEIN UA: NEGATIVE MG/DL
PROTHROMBIN TIME: 13.5 SEC (ref 12.3–14.9)
RBC # BLD: 4.21 M/UL (ref 4.7–6.1)
SARS-COV-2, NAAT: NOT DETECTED
SARS-COV-2, NAAT: NOT DETECTED
SODIUM BLD-SCNC: 137 MEQ/L (ref 135–144)
SPECIFIC GRAVITY UA: 1.02 (ref 1–1.03)
TCO2 ARTERIAL: 29
TOTAL PROTEIN: 8.4 G/DL (ref 6.3–8)
TROPONIN: <0.01 NG/ML (ref 0–0.01)
UREA NITROGEN, UR: 1100 MG/DL
UROBILINOGEN, URINE: 0.2 E.U./DL
WBC # BLD: 10.2 K/UL (ref 4.8–10.8)

## 2020-10-01 PROCEDURE — 83880 ASSAY OF NATRIURETIC PEPTIDE: CPT

## 2020-10-01 PROCEDURE — 82570 ASSAY OF URINE CREATININE: CPT

## 2020-10-01 PROCEDURE — 85025 COMPLETE CBC W/AUTO DIFF WBC: CPT

## 2020-10-01 PROCEDURE — 6360000002 HC RX W HCPCS: Performed by: EMERGENCY MEDICINE

## 2020-10-01 PROCEDURE — 71045 X-RAY EXAM CHEST 1 VIEW: CPT

## 2020-10-01 PROCEDURE — 94640 AIRWAY INHALATION TREATMENT: CPT

## 2020-10-01 PROCEDURE — 94761 N-INVAS EAR/PLS OXIMETRY MLT: CPT

## 2020-10-01 PROCEDURE — 6370000000 HC RX 637 (ALT 250 FOR IP): Performed by: INTERNAL MEDICINE

## 2020-10-01 PROCEDURE — 36415 COLL VENOUS BLD VENIPUNCTURE: CPT

## 2020-10-01 PROCEDURE — 96365 THER/PROPH/DIAG IV INF INIT: CPT

## 2020-10-01 PROCEDURE — 94664 DEMO&/EVAL PT USE INHALER: CPT

## 2020-10-01 PROCEDURE — 85610 PROTHROMBIN TIME: CPT

## 2020-10-01 PROCEDURE — 2580000003 HC RX 258: Performed by: EMERGENCY MEDICINE

## 2020-10-01 PROCEDURE — 84484 ASSAY OF TROPONIN QUANT: CPT

## 2020-10-01 PROCEDURE — 36600 WITHDRAWAL OF ARTERIAL BLOOD: CPT

## 2020-10-01 PROCEDURE — 85730 THROMBOPLASTIN TIME PARTIAL: CPT

## 2020-10-01 PROCEDURE — U0002 COVID-19 LAB TEST NON-CDC: HCPCS

## 2020-10-01 PROCEDURE — 87040 BLOOD CULTURE FOR BACTERIA: CPT

## 2020-10-01 PROCEDURE — 83605 ASSAY OF LACTIC ACID: CPT

## 2020-10-01 PROCEDURE — 1210000000 HC MED SURG R&B

## 2020-10-01 PROCEDURE — 93005 ELECTROCARDIOGRAM TRACING: CPT

## 2020-10-01 PROCEDURE — 84540 ASSAY OF URINE/UREA-N: CPT

## 2020-10-01 PROCEDURE — 99283 EMERGENCY DEPT VISIT LOW MDM: CPT

## 2020-10-01 PROCEDURE — 96375 TX/PRO/DX INJ NEW DRUG ADDON: CPT

## 2020-10-01 PROCEDURE — 80053 COMPREHEN METABOLIC PANEL: CPT

## 2020-10-01 PROCEDURE — 99284 EMERGENCY DEPT VISIT MOD MDM: CPT

## 2020-10-01 PROCEDURE — 99223 1ST HOSP IP/OBS HIGH 75: CPT | Performed by: INTERNAL MEDICINE

## 2020-10-01 PROCEDURE — 76775 US EXAM ABDO BACK WALL LIM: CPT

## 2020-10-01 PROCEDURE — 82803 BLOOD GASES ANY COMBINATION: CPT

## 2020-10-01 PROCEDURE — 85379 FIBRIN DEGRADATION QUANT: CPT

## 2020-10-01 PROCEDURE — 81003 URINALYSIS AUTO W/O SCOPE: CPT

## 2020-10-01 RX ORDER — ALBUTEROL SULFATE 90 UG/1
2 AEROSOL, METERED RESPIRATORY (INHALATION)
Status: DISCONTINUED | OUTPATIENT
Start: 2020-10-01 | End: 2020-10-04 | Stop reason: HOSPADM

## 2020-10-01 RX ORDER — IPRATROPIUM BROMIDE AND ALBUTEROL SULFATE 2.5; .5 MG/3ML; MG/3ML
1 SOLUTION RESPIRATORY (INHALATION)
Status: DISCONTINUED | OUTPATIENT
Start: 2020-10-01 | End: 2020-10-01

## 2020-10-01 RX ORDER — POTASSIUM CHLORIDE 1.5 G/1.77G
10 POWDER, FOR SOLUTION ORAL DAILY
COMMUNITY

## 2020-10-01 RX ORDER — BUSPIRONE HYDROCHLORIDE 5 MG/1
5 TABLET ORAL 2 TIMES DAILY
COMMUNITY

## 2020-10-01 RX ORDER — FAMOTIDINE 20 MG/1
20 TABLET, FILM COATED ORAL 2 TIMES DAILY
Status: DISCONTINUED | OUTPATIENT
Start: 2020-10-01 | End: 2020-10-01 | Stop reason: DRUGHIGH

## 2020-10-01 RX ORDER — M-VIT,TX,IRON,MINS/CALC/FOLIC 27MG-0.4MG
1 TABLET ORAL DAILY
COMMUNITY

## 2020-10-01 RX ORDER — 0.9 % SODIUM CHLORIDE 0.9 %
500 INTRAVENOUS SOLUTION INTRAVENOUS ONCE
Status: COMPLETED | OUTPATIENT
Start: 2020-10-01 | End: 2020-10-01

## 2020-10-01 RX ORDER — LEVOFLOXACIN 5 MG/ML
500 INJECTION, SOLUTION INTRAVENOUS ONCE
Status: COMPLETED | OUTPATIENT
Start: 2020-10-01 | End: 2020-10-01

## 2020-10-01 RX ORDER — MAGNESIUM OXIDE 400 MG/1
400 TABLET ORAL DAILY
COMMUNITY

## 2020-10-01 RX ORDER — TAMSULOSIN HYDROCHLORIDE 0.4 MG/1
0.4 CAPSULE ORAL DAILY
COMMUNITY

## 2020-10-01 RX ORDER — LANOLIN ALCOHOL/MO/W.PET/CERES
3 CREAM (GRAM) TOPICAL NIGHTLY PRN
Status: DISCONTINUED | OUTPATIENT
Start: 2020-10-01 | End: 2020-10-04 | Stop reason: HOSPADM

## 2020-10-01 RX ORDER — ONDANSETRON 2 MG/ML
4 INJECTION INTRAMUSCULAR; INTRAVENOUS EVERY 6 HOURS PRN
Status: DISCONTINUED | OUTPATIENT
Start: 2020-10-01 | End: 2020-10-04 | Stop reason: HOSPADM

## 2020-10-01 RX ORDER — NINTEDANIB 150 MG/1
150 CAPSULE ORAL 2 TIMES DAILY
COMMUNITY

## 2020-10-01 RX ORDER — PANTOPRAZOLE SODIUM 40 MG/1
40 TABLET, DELAYED RELEASE ORAL DAILY
COMMUNITY

## 2020-10-01 RX ORDER — PROMETHAZINE HYDROCHLORIDE 12.5 MG/1
12.5 TABLET ORAL EVERY 6 HOURS PRN
Status: DISCONTINUED | OUTPATIENT
Start: 2020-10-01 | End: 2020-10-04 | Stop reason: HOSPADM

## 2020-10-01 RX ORDER — FAMOTIDINE 20 MG/1
20 TABLET, FILM COATED ORAL DAILY
Status: DISCONTINUED | OUTPATIENT
Start: 2020-10-01 | End: 2020-10-02

## 2020-10-01 RX ORDER — LORAZEPAM 0.5 MG/1
0.5 TABLET ORAL EVERY 8 HOURS PRN
COMMUNITY

## 2020-10-01 RX ORDER — METHYLPREDNISOLONE SODIUM SUCCINATE 125 MG/2ML
250 INJECTION, POWDER, LYOPHILIZED, FOR SOLUTION INTRAMUSCULAR; INTRAVENOUS ONCE
Status: COMPLETED | OUTPATIENT
Start: 2020-10-01 | End: 2020-10-01

## 2020-10-01 RX ORDER — NICOTINE 21 MG/24HR
1 PATCH, TRANSDERMAL 24 HOURS TRANSDERMAL EVERY 24 HOURS
COMMUNITY
Start: 2020-09-30 | End: 2020-11-11

## 2020-10-01 RX ORDER — PREDNISONE 10 MG/1
40 TABLET ORAL DAILY
Status: DISCONTINUED | OUTPATIENT
Start: 2020-10-01 | End: 2020-10-04

## 2020-10-01 RX ADMIN — ALBUTEROL SULFATE 2 PUFF: 90 AEROSOL, METERED RESPIRATORY (INHALATION) at 23:40

## 2020-10-01 RX ADMIN — Medication 3 MG: at 23:59

## 2020-10-01 RX ADMIN — PREDNISONE 40 MG: 10 TABLET ORAL at 07:36

## 2020-10-01 RX ADMIN — LEVOFLOXACIN 500 MG: 5 INJECTION, SOLUTION INTRAVENOUS at 03:21

## 2020-10-01 RX ADMIN — FAMOTIDINE 20 MG: 20 TABLET ORAL at 07:36

## 2020-10-01 RX ADMIN — METHYLPREDNISOLONE SODIUM SUCCINATE 250 MG: 125 INJECTION, POWDER, FOR SOLUTION INTRAMUSCULAR; INTRAVENOUS at 03:20

## 2020-10-01 RX ADMIN — SODIUM CHLORIDE 500 ML: 9 INJECTION, SOLUTION INTRAVENOUS at 04:05

## 2020-10-01 RX ADMIN — IPRATROPIUM BROMIDE AND ALBUTEROL 1 PUFF: 20; 100 SPRAY, METERED RESPIRATORY (INHALATION) at 13:55

## 2020-10-01 ASSESSMENT — PAIN SCALES - GENERAL
PAINLEVEL_OUTOF10: 0

## 2020-10-01 NOTE — ED NOTES
Transfer center called to initiate transfer to [facility], [address] [place] [address]  10/01/20 1789

## 2020-10-01 NOTE — CONSULTS
Critical Care Medicine  Consult Note      Reason for consultation: Acute on chronic hypoxic respiratory failure    HISTORY OF PRESENT ILLNESS:    This is a C3-year-old gentleman who was diagnosed with idiopathic pulmonary fibrosis several months ago and has been under the care of a pulmonologist on the [address], patient was on corticosteroid therapy initially which was being weaned off, he was just started on Ofev 3 weeks ago, he has been residing at the nursing home on 6 L of oxygen continuously, he has been having significant difficulty with any exertion just getting out of bed he was getting \"blackouts\" and falling. His oxygen saturation has been noted to be in the low 70s with any exertion. He was transported to the emergency department and from there was transferred to us intensive care unit due to respiratory distress and hypoxia. The time I saw him this morning during critical care rounds, he appeared fully alert awake very pleasant in no distress he is actually back on his usual 6 L of oxygen per nasal cannula with saturation up to 96% if completely idle but desaturates easily with movement and conversation into the low 90s or upper 80s. He denies any significant cough, sputum production, fevers, chills night sweats, hemoptysis, pleuritic pain or other significant additional symptoms. Patient is an active smoker but no established diagnosis of COPD in the past.  He had not been on bronchodilator therapy by his pulmonologist.  Unfortunately I do not have any of his records to review at this point, especially his x-rays or CTs at baseline.   His chest x-ray on admission here showed diffuse interstitial infiltrates with volume loss all suggestive of established severe pulmonary fibrosis          Past Medical History:        Diagnosis Date    Acidosis     Adrenal tumor     Alcohol abuse     Anemia     Anxiety     At high risk for self harm     Atherosclerotic heart disease     BPH (benign prostatic hyperplasia)     Depression     Esophageal varices (HCC)     Fatty liver     Heart failure (HCC)     Hyperlipidemia     Hypertension     Peptic ulcer     Pulmonary edema     Pulmonary fibrosis (HCC)     Thrombocytopenia (HCC)     TIA (transient ischemic attack)        Past Surgical History:        Procedure Laterality Date    APPENDECTOMY      CARDIAC CATHETERIZATION  03/06/2019    DR. VARNER    HERNIA REPAIR         Social History:     reports that he has been smoking. He has never used smokeless tobacco. He reports previous alcohol use. He reports that he does not use drugs. Family History:   No family history on file. Allergies:  Patient has no known allergies. MEDICATIONS during current hospitalization:    Continuous Infusions:    Scheduled Meds:   predniSONE  40 mg Oral Daily    famotidine  20 mg Oral Daily    albuterol-ipratropium  1 puff Inhalation TID       PRN Meds:promethazine **OR** ondansetron        REVIEW OF SYSTEMS:  As in history of present illness  Other 14 point review of system is negative. PHYSICAL EXAM:    Vitals:  BP (!) 154/80   Pulse 126   Temp 98.7 °F (37.1 °C) (Axillary)   Resp 24   Wt 206 lb 9.1 oz (93.7 kg)   SpO2 94%   BMI 31.41 kg/m²   General: Patient is currently alert, awake . comfortable in bed, No distress. Head: Atraumatic , Normocephalic   Eyes: PERRL. No icteric sclera. No conjunctival injection. No discharge   ENT: No nasal  discharge. Pharynx clear. Neck:  Trachea midline. No thyromegaly, no JVD, No cervical adenopathy. Chest : Adequate spontaneous resting effort, tachypneic with any exertion, symmetric bilateral excursions  Lung : Mildly decreased breath sounds bilaterally, inspiratory Velcro type crackles in the bases bilaterally  Heart[de-identified] Regular rhythm and rate. No mumur ,  Rub or gallop  ABD: Benign. Non-tender. Non-distended. No masses or organmegaly. Normal bowel sounds.    EXT: No significant pitting edema both lower extremities , No Cyanosis No clubbing  Neuro: no focal weakness  Skin: Warm and dry. No erythema or rash on exposed extremities. .      Data Review  Recent Labs     10/01/20  0256   WBC 10.2   HGB 13.4*   HCT 38.9*         Recent Labs     10/01/20  0256      K 3.9   CL 92*   CO2 29   BUN 38*   CREATININE 2.05*   GLUCOSE 136*       MV Settings: ABGs:   Recent Labs     10/01/20  0308   PHART 7.455*   QGT6SJR 40   PO2ART 104*   THY6OIB 27.8   BEART 4   T0IFVDBJ 98*   CPY2DMB 29     O2 Device: Other (Comment)(venti mask)  Lab Results   Component Value Date    LACTA 2.7 10/01/2020       Radiology  Xr Chest Portable    Result Date: 10/1/2020  XR CHEST PORTABLE : 10/1/2020 CLINICAL HISTORY:  sob . COMPARISON: 9/12/2020. TECHNIQUE: An upright portable AP radiograph of the chest was obtained. FINDINGS: Shallow inspiratory volumes are present with moderate predominantly peripheral coarsening of the bronchovascular structures suggestive of interstitial lung disease, not significantly changed from 9/12/2020. Superimposed interstitial edema, atypical pneumonia, or other less likely possibilities should be considered. There is no cardiomegaly, significant pleural effusion, gross vascular congestion, pneumothorax, or displaced fractures identified. STABLE CHEST FROM 9/12/2020 SUGGESTING MODERATE INTERSTITIAL LUNG DISEASE SUCH AS UIP. INTERSTITIAL EDEMA, ATYPICAL PNEUMONIA, OR OTHER LESS LIKELY POSSIBILITIES ARE ALSO CONSIDERATIONS. Xr Chest Portable    Result Date: 9/12/2020  EXAMINATION: CHEST PORTABLE VIEW  CLINICAL HISTORY: Short of breath COMPARISONS: None  FINDINGS: Single  views of the chest is submitted. The cardiac silhouette is enlarged . Pulmonary vascular congested with increased interstitial markings. Right sided trachea. There is patchy multifocal to coalescent airspace disease throughout the lung parenchyma. .  No Pneumothoraces. QUESTION OF PULMONARY VASCULAR CONGESTED WITH INCREASED INTERSTITIAL MARKINGS VERSUS CHRONIC INTERSTITIAL CHANGES. Marquise Verdugo QUESTION OF PATCHY MULTIFOCAL TO COALESCENT AIRSPACE DISEASE THROUGHOUT SUPERIMPOSED UPON THE LUNG PARENCHYMA. ... THE CONSTELLATION OF FINDINGS RECOMMEND CT SCAN THE CHEST WITHOUT IV CONTRAST TO FURTHER EVALUATE      Assessment, plan:   1. Severe idiopathic pulmonary fibrosis with an apparent slow progression, not unusual for this disease, on adequate therapy with corticosteroids, and recent initiation of Ofev. There is no clear evidence of acute exacerbation given patient's stability on his usual oxygen therapy noted at rest.  Exertional severe desaturations to the point of presyncopal symptoms, are not uncommon at this stage of pulmonary fibrosis. This will require additional oxygen set up for high flow, or liquid oxygen with mask, and inability to titrate, which may need a different set up than what the nursing home was able to provide  2. Chronic hypoxic respiratory failure with mild acute decompensation as mentioned above again patient will need adequate set up for oxygen supplement beyond 6 L  Would continue current treatment with prednisone at 40 mg daily as patient does not have clear evidence of acute flareup that would require high-dose or bolus dose IV steroids. Monitor oxygenation and titrate as needed, arrange for oxygen set up to provide more oxygen than 6 L to be used with any exertion. Comfort care and hospice care were discussed and strongly recommended at this stage as well.         Electronically signed by Irving Valdez MD, FCCP on 10/1/2020 at 11:47 AM

## 2020-10-01 NOTE — CARE COORDINATION
Per the case , the patient may not want to return to [facility]. Per Nuria Villanueva, the patient is a bed hold, long term. Also will need a palliative care order per the case . Electronically signed by PANCHTIO Ackerman on 10/1/20 at 4:33 PM EDT    The LSW talked to the patient by telephone. The patient verbally signed the IMM and expressed an understanding of the medicare appeal process. The patient states he was approved for medicaid as a secondary. The LSW left a message for Genaro Nazario, Help. The patient state his discharge plan is to return to [facility]. The patient states his family talked and he is agreeable to return to [facility].  Electronically signed by PANCHITO Ackerman on 10/1/20 at 4:37 PM EDT

## 2020-10-01 NOTE — PROGRESS NOTES
06:30am Assumed care of patient. Alert and oriented. VSS MP-ST. 02 40% venti mask. Lungs Diminshed to ausculatation. No skin issues meplex on. No complaints of  0800am External catheter placed per patient request.  Remains 40% venti mask. VSS  No complaints of resp distress at this time  09:40am Critical care rounds with DR Marsha Kramer and team  09:45am Father Amaya Paloma in to see patient  12 noon remains alert and oriented. VSS MP_ST. No complaints  1600pm remains alert and oriented.  No complaints  18:50pm Second covid and urine tests obtained and sent

## 2020-10-01 NOTE — ED PROVIDER NOTES
eMERGENCY dEPARTMENT eNCOUnter      [facility]    Chief Complaint   Patient presents with    Shortness of Breath     pt from [facility] home, normally on [address] continuos, pt always short of breath states he felt dizzy and lightheaded tonight vitals per EMS per ems , 106/73, 86% 6L, pt states he normally runs 92-93% on 8L pt is [facility]       HPI    Lesvia Quinonez is a 61 y.o. male who presentsto ED from [address] [facility]  By EMS  With complaint of short of breath  Onset 1 day  Intensity of symptoms severe  Patient has a history of interstitial pulmonary disease, pulmonary fibrosis and is on 6 L of oxygen at the nursing home. Patient has been more short of breath than usual for the last 1 day. His oxygen need was increased to 8 L. Patient continues to smoke. On arrival to the ED patient was hypoxic in the mid 80s on 8 L of nasal cannula. Patient was also tachycardic to 120s. Patient was sitting at the edge of the bed and passed out for about 30 seconds. At that time the nursing home staff noticed his fingernails to be really cyanotic. Patient denies any chest pain, fever, cough with expectoration, calf pain. PAST MEDICAL HISTORY    Past Medical History:   Diagnosis Date    Acidosis     Adrenal tumor     Alcohol abuse     Anemia     Anxiety     At high risk for self harm     Atherosclerotic heart disease     BPH (benign prostatic hyperplasia)     Depression     Esophageal varices (HCC)     Fatty liver     Heart failure (HCC)     Hyperlipidemia     Hypertension     Peptic ulcer     Pulmonary edema     Pulmonary fibrosis (HCC)     Thrombocytopenia (HCC)     TIA (transient ischemic attack)        SURGICAL HISTORY    Past Surgical History:   Procedure Laterality Date    APPENDECTOMY      CARDIAC CATHETERIZATION  03/06/2019    DR. VARNER    HERNIA REPAIR         CURRENT MEDICATIONS    Current Outpatient Rx   Medication Sig Dispense Refill    LORazepam (ATIVAN) 0.5 MG tablet Take 0.5 mg by mouth every 8 hours as needed for Anxiety.  busPIRone (BUSPAR) 5 MG tablet Take 5 mg by mouth 2 times daily      tamsulosin (FLOMAX) 0.4 MG capsule Take 0.4 mg by mouth daily      magnesium oxide (MAG-OX) 400 MG tablet Take 400 mg by mouth daily      Multiple Vitamins-Minerals (THERAPEUTIC MULTIVITAMIN-MINERALS) tablet Take 1 tablet by mouth daily      nicotine polacrilex (NICORETTE) 4 MG gum Take 4 mg by mouth as needed for Smoking cessation      nicotine (NICODERM CQ) 14 MG/24HR Place 1 patch onto the skin every 24 hours      Nintedanib Esylate (OFEV) 150 MG CAPS Take 150 mg by mouth 2 times daily      potassium chloride (KLOR-CON) 20 MEQ packet Take 10 mEq by mouth daily      pantoprazole (PROTONIX) 40 MG tablet Take 40 mg by mouth daily      vitamin D (CHOLECALCIFEROL) 125 MCG (5000 UT) CAPS capsule Take 5,000 Units by mouth daily      aspirin 81 MG EC tablet Take 81 mg by mouth daily      atorvastatin (LIPITOR) 20 MG tablet Take 20 mg by mouth daily       FLUoxetine (PROZAC) 40 MG capsule TAKE 1 CAPSULE BY MOUTH ONCE DAILY      folic acid (FOLVITE) 1 MG tablet Take 1,000 mcg by mouth daily      FLUoxetine (PROZAC) 20 MG capsule TAKE 1 CAPSULE BY MOUTH ONCE DAILY      THIAMINE MONONITRATE PO Take 1 tablet by mouth daily      furosemide (LASIX) 20 MG tablet Take 40 mg by mouth 2 times daily       spironolactone (ALDACTONE) 50 MG tablet Take 50 mg by mouth daily       metoprolol tartrate (LOPRESSOR) 50 MG tablet Take 50 mg by mouth 2 times daily       losartan (COZAAR) 100 MG tablet Take 100 mg by mouth daily          ALLERGIES    No Known Allergies    FAMILY HISTORY    History reviewed. No pertinent family history.     SOCIAL HISTORY    Social History     Socioeconomic History    Marital status: Single     Spouse name: None    Number of children: None    Years of education: None    Highest education level: None   Occupational History    None Social Needs    Financial resource strain: None    Food insecurity     Worry: None     Inability: None    Transportation needs     Medical: None     Non-medical: None   Tobacco Use    Smoking status: Current Some Day Smoker    Smokeless tobacco: Never Used    Tobacco comment: 3 small puff of cig a day   Substance and Sexual Activity    Alcohol use: Not Currently     Comment: Since March 2020    Drug use: Never    Sexual activity: Not Currently   Lifestyle    Physical activity     Days per week: None     Minutes per session: None    Stress: None   Relationships    Social connections     Talks on phone: None     Gets together: None     Attends Ep[employer] service: None     Active member of club or organization: None     Attends meetings of clubs or organizations: None     Relationship status: None    Intimate partner violence     Fear of current or ex partner: None     Emotionally abused: None     Physically abused: None     Forced sexual activity: None   Other Topics Concern    None   Social History Narrative    None       REVIEW OF SYSTEMS    Constitutional:  Denies fever, chills, weight loss or weakness   Eyes:  Denies photophobia or discharge   HENT:  Denies sore throat or ear pain   Respiratory:  Denies cough but complains of severe shortness of breath   Cardiovascular:  Denies chest pain, palpitations or swelling   GI:  Denies abdominal pain, nausea, vomiting, or diarrhea   Musculoskeletal:  Denies back pain   Skin:  Denies rash   Neurologic:  Denies headache, focal weakness or sensory changes   Endocrine:  Denies polyuria or polydypsia   Lymphatic:  Denies swollen glands   Psychiatric:  Denies depression, suicidal ideation or homicidal ideation   All systems negative except as marked.      PHYSICAL EXAM    VITAL SIGNS: /67   Pulse 103   Temp 98.9 °F (37.2 °C) (Oral)   Resp 28   Wt 204 lb 3.2 oz (92.6 kg)   SpO2 94%   BMI 31.05 kg/m²    Constitutional:  Well developed, Well nourished, moderate acute distress, Non-toxic appearance. HENT:  Normocephalic, Atraumatic, Bilateral external ears normal, Oropharynx moist, No oral exudates, Nose normal. Neck- Normal range of motion, No tenderness, Supple, No stridor. Eyes:  PERRL, EOMI, Conjunctiva normal, No discharge. Respiratory:   tachypneic, moderate respiratory distress, No wheezing, No chest tenderness. Cardiovascular: Tachycardic, Normal rhythm, No murmurs, No rubs, No gallops. GI:  Bowel sounds normal, Soft, No tenderness, No masses, No pulsatile masses. : No CVA tenderness. Musculoskeletal:  Intact distal pulses, No edema, No tenderness, No cyanosis, No clubbing. Good range of motion in all major joints. No tenderness to palpation or major deformities noted. Back- No tenderness. Integument:  Warm, Dry, No erythema, No rash. Lymphatic:  No lymphadenopathy noted. Neurologic:  Alert & oriented x 3, Normal motor function, Normal sensory function, No focal deficits noted. Psychiatric:  Affect normal, Judgment normal, Mood normal.     EKG    Sinus tachycardia,  , Normal Axis, nonspecific ST- T wave changes, QTc 463    RADIOLOGY    XR CHEST PORTABLE   Final Result      STABLE CHEST FROM 9/12/2020 SUGGESTING MODERATE INTERSTITIAL LUNG DISEASE SUCH AS UIP. INTERSTITIAL EDEMA, ATYPICAL PNEUMONIA, OR OTHER LESS LIKELY POSSIBILITIES ARE ALSO CONSIDERATIONS. REEVALUATION     Patient was updated the results of labs and Radiology. I have personally provided  30  minutes of critical care time exclusive of time spent on separately billable procedures. Time includes review of laboratory data, radiology results, discussion with consultants, and monitoring for potential decompensation. Interventions were performed as documented above. Spoke with hospitalist accepted patient admission.   Labs  Labs Reviewed   CBC WITH AUTO DIFFERENTIAL - Abnormal; Notable for the following components:       Result Value    RBC 4.21 (*)     Hemoglobin 13.4 (*)     Hematocrit 38.9 (*)     MCH 31.9 (*)     Neutrophils Absolute 6.6 (*)     All other components within normal limits   COMPREHENSIVE METABOLIC PANEL W/ REFLEX TO MG FOR LOW K - Abnormal; Notable for the following components:    Chloride 92 (*)     Anion Gap 16 (*)     Glucose 136 (*)     BUN 38 (*)     CREATININE 2.05 (*)     GFR Non- 33.2 (*)     GFR  40.2 (*)     Calcium 10.8 (*)     Total Protein 8.4 (*)     Total Bilirubin 1.0 (*)     Globulin 4.2 (*)     All other components within normal limits   LACTIC ACID, PLASMA - Abnormal; Notable for the following components:    Lactic Acid 2.7 (*)     All other components within normal limits   POCT ARTERIAL - Abnormal; Notable for the following components:    pH, Arterial 7.455 (*)     pO2, Arterial 104 (*)     O2 Sat, Arterial 98 (*)     All other components within normal limits   CULTURE, BLOOD 1    Narrative:     ORDER#: [id number]                          ORDERED BY: LEONCIO MAYEN  SOURCE: Blood                              COLLECTED:  10/01/20 03:22  ANTIBIOTICS AT [facility].:                      RECEIVED :  10/01/20 06:46   CULTURE, BLOOD 2    Narrative:     ORDER#: [id number]                          ORDERED BY: LEONCIO MAYEN  SOURCE: Blood                              COLLECTED:  10/01/20 03:22  ANTIBIOTICS AT [facility].:                      RECEIVED :  10/01/20 06:46   TROPONIN   BRAIN NATRIURETIC PEPTIDE   PROTIME-INR   APTT   D-DIMER, QUANTITATIVE   COVID-19   URINALYSIS   BLOOD GAS, ARTERIAL             Summation      Patient Course:     ED Medications administered this visit:    Medications   methylPREDNISolone sodium (SOLU-MEDROL) injection 250 mg (250 mg Intravenous Given 10/1/20 0320)   levoFLOXacin (LEVAQUIN) 500 MG/100ML infusion 500 mg (0 mg Intravenous Stopped 10/1/20 0537)   0.9 % sodium chloride bolus (0 mLs Intravenous Stopped 10/1/20 0824)       New Prescriptions from this visit: Discharge Medication List as of 10/1/2020  5:34 AM          Follow-up:  No follow-up provider specified. Final Impression:   1. Acute respiratory failure with hypoxia (HCC)    2. Pulmonary fibrosis ([address]    3. Tobacco abuse    4.  Syncope and collapse               (Please note that portions of this note were completed with a voice recognition program.  Efforts were made to edit the dictations but occasionally words are mis-transcribed.)          Chandrika Mayes MD  10/05/20 8234

## 2020-10-01 NOTE — PROGRESS NOTES
0600 Pt arrived per [facility]. Ambulated to bed. On Venti mask at 40%. Alert and oriented. Monitor shows sinus tach. 1001 Norbert Rodriguez with lab RE: Covid test from [facility]. Lab stated they do not have it- it is not processed. [address] [facility] from [address] [place] [address]. She called [facility] lab- specimen was picked up by . Report given to Rhiannon Lin RN.

## 2020-10-01 NOTE — PROGRESS NOTES
[facility] [place] Respiratory Therapy Evaluation   Current Order:  TID COMBIVENT      Home Regimen: NONE      Ordering Physician: DR Sal Rich  Re-evaluation Date:  10/4     Diagnosis: PUL FIBROSIS      Patient Status: Stable    The following MDI Criteria must be met in order to convert aerosol to MDI with spacer. If unable to meet, MDI will be converted to aerosol:  []  Patient able to demonstrate the ability to use MDI effectively  []  Patient alert and cooperative  []  Patient able to take deep breath with 5-10 second hold  []  Medication(s) available in this delivery method   []  Peak flow greater than or equal to 200 ml/min            Current Order Substituted To  (same drug, same frequency)   Aerosol to MDI [] Albuterol Sulfate 0.083% unit dose by aerosol Albuterol Sulfate MDI 2 puffs by inhalation with spacer    [] Levalbuterol 1.25 mg unit dose by aerosol Levalbuterol MDI 2 puffs by inhalation with spacer    [] Levalbuterol 0.63 mg unit dose by aerosol Levalbuterol MDI 2 puffs by inhalation with spacer    [] Ipratropium Bromide 0.02% unit dose by aerosol Ipratropium Bromide MDI 2 puffs by inhalation with spacer    [] Duoneb (Ipratropium + Albuterol) unit dose by aerosol Ipratropium MDI + Albuterol MDI 2 puffs by inhalation w/spacer   MDI to Aerosol [] Albuterol Sulfate MDI Albuterol Sulfate 0.083% unit dose by aerosol    [] Levalbuterol MDI 2 puffs by inhalation Levalbuterol 1.25 mg unit dose by aerosol    [] Ipratropium Bromide MDI by inhalation Ipratropium Bromide 0.02% unit dose by aerosol    [] Combivent (Ipratropium + Albuterol) MDI by inhalation Duoneb (Ipratropium + Albuterol) unit dose by aerosol   Treatment Assessment [Frequency/Schedule]:  Change frequency to: ______________TID COMVIVENT & albuterol Q2 PRN ___________________per Protocol, P&T, MEC      Points 0 1 2 3 4   Pulmonary Status  Non-Smoker  []   Smoking history   < 20 pack years  []   Smoking history  ?  20 pack years  []   Pulmonary Disorder  (acute or chronic)  [x]   Severe or Chronic w/ Exacerbation  []     Surgical Status No [x]   Surgeries     General []   Surgery Lower []   Abdominal Thoracic or []   Upper Abdominal Thoracic with  PulmonaryDisorder  []     Chest X-ray Clear/Not  Ordered     []  Chronic Changes  Results Pending  []  Infiltrates, atelectasis, pleural effusion, or edema  [x]  Infiltrates in more than one lobe []  Infiltrate + Atelectasis, &/or pleural effusion  []    Respiratory Pattern Regular,  RR = 12-20 [x]  Increased,  RR = 21-25 []  BONE, irregular,  or RR = 26-30 []  Decreased FEV1  or RR = 31-35 []  Severe SOB, use  of accessory muscles, or RR ? 35  []    Mental Status Alert, oriented,  Cooperative [x]  Confused but Follows commands []  Lethargic or unable to follow commands []  Obtunded  []  Comatose  []    Breath Sounds Clear to  auscultation  []  Decreased unilaterally or  in bases only []  Decreased  bilaterally  [x]  Crackles or intermittent wheezes []  Wheezes []    Cough Strong, Spontan., & nonproductive [x]  Strong,  spontaneous, &  productive []  Weak,  Nonproductive []  Weak, productive or  with wheezes []  No spontaneous  cough or may require suctioning []    Level of Activity Ambulatory []  Ambulatory w/ Assist  [x]  Non-ambulatory []  Paraplegic []  Quadriplegic []    Total    Score:___8____     Triage Score:___4_____      Tri       Triage:     1. (>20) Freq: Q3    2. (16-20) Freq: Q4   3. (11-15) Freq: QID & Albuterol Q2 PRN    4. (6-10) Freq: TID & Albuterol Q2 PRN    5. (0-5) Freq Q4prn

## 2020-10-01 NOTE — ED NOTES
Pt gave verbal consent to update his sister Charly Given [phone] who has called for an update.  Charly Given updated on patient status     Ameena Osborn RN  10/01/20 9445

## 2020-10-01 NOTE — DISCHARGE INSTR - COC
Continuity of Care Form    Patient Name: Krishna Smallwood   :  1960  MRN:  [id number]    Admit date:  10/1/2020  Discharge date:  ***    Code Status Order: DNR-CCA   Advance Directives:   [facility] Documentation     Date/Time Healthcare Directive Type of Healthcare Directive Copy in [address] [facility] [address] Agent's Name Healthcare Agent's Phone Number    10/01/20 9315  Yes, patient has an advance directive for healthcare treatment  --  --  --  --  --          Admitting Physician:  Hannah Austin DO  PCP: Josie No MD    Discharging Nurse: [facility] Unit/Room#: IC09/IC09-01  Discharging Unit Phone Number: ***    Emergency Contact:   Extended Emergency Contact Information  Primary Emergency Contact: [facility] nanette nuñez Phone: [phone]  Relation: Brother/Sister    Past Surgical History:  Past Surgical History:   Procedure Laterality Date    APPENDECTOMY      CARDIAC CATHETERIZATION  2019    DR. VARNER    HERNIA REPAIR         Immunization History: There is no immunization history on file for this patient.     Active Problems:  Patient Active Problem List   Diagnosis Code    Abnormal echocardiogram R93.1       Isolation/Infection:   Isolation          Droplet Plus        Patient Infection Status     Infection Onset Added Last Indicated Last Indicated By Review Planned Expiration Resolved Resolved By    COVID-19 Rule Out 10/01/20 10/01/20 10/01/20 COVID-19 (Ordered) 10/08/20 10/15/20      Resolved    COVID-19 Rule Out 10/01/20 10/01/20 10/01/20 COVID-19 (Ordered)   10/01/20 Rule-Out Test Resulted          Nurse Assessment:  Last Vital Signs: /87   Pulse 120   Temp 98.7 °F (37.1 °C) (Axillary)   Resp 26   Wt 206 lb 9.1 oz (93.7 kg)   SpO2 91%   BMI 31.41 kg/m²     Last documented pain score (0-10 scale): Pain Level: 0  Last Weight:   Wt Readings from Last 1 Encounters:   10/01/20 206 lb 9.1 oz (93.7 kg)     Mental Status:  oriented and alert    IV Access:  [facility] TESHA IV ACCESS:[id number]}    Nursing Mobility/ADLs:  Walking   Assisted  Transfer  Assisted  Bathing  Assisted  Dressing  Assisted  Toileting  Assisted  Feeding  Independent  Med Admin  Assisted  Med Delivery   whole    Wound Care Documentation and Therapy:        Elimination:  Continence:   · Bowel: Yes  · Bladder: Yes  Urinary Catheter: Insertion Date: 10/1/20   Colostomy/Ileostomy/Ileal Conduit: No       Date of Last BM: 9/30/20    Intake/Output Summary (Last 24 hours) at 10/1/2020 0959  Last data filed at 10/1/2020 0900  Gross per 24 hour   Intake 300 ml   Output 300 ml   Net 0 ml     I/O last 3 completed shifts:  In: -   Out: 300 [Urine:300]    Safety Concerns:     History of Falls (last 30 days)    Impairments/Disabilities:      None    Nutrition Therapy:  Current Nutrition Therapy:   - Oral Diet:  General    Routes of Feeding: Oral  Liquids: No Restrictions  Daily Fluid Restriction: no  Last Modified Barium Swallow with Video (Video Swallowing Test): not done    Treatments at the Time of Hospital Discharge:   Respiratory Treatments: ***  Oxygen Therapy:  is on oxygen at 6 L/min per nasal cannula.   Ventilator:    - No ventilator support    Rehab Therapies: Physical Therapy and Occupational Therapy  Weight Bearing Status/Restrictions: No weight bearing restirctions  Other Medical Equipment (for information only, NOT a DME order):  ***  Other Treatments: ***    Patient's personal belongings (please select all that are sent with patient):  shirt pants cell phone     RN SIGNATURE:  {Esignature:[id number]}    CASE MANAGEMENT/SOCIAL WORK SECTION    Inpatient Status Date: ***    Readmission Risk Assessment Score:  Readmission Risk              Risk of Unplanned Readmission:        13           Discharging to Facility/ Agency   · Name: [facility]  · Address:  · Phone:[phone]  · Fax:    Dialysis Facility (if applicable)   · Name:  · Address:  · Dialysis Schedule:  · Phone:  · Fax:    / signature: Electronically signed by PANCHITO Kirkpatrick on 10/1/20 at 4:31 PM EDT      PHYSICIAN SECTION    Prognosis: {Prognosis:5557334311}    Condition at Discharge: 50Angella Nunes Patient Condition:240606328}    Rehab Potential (if transferring to Rehab): {Prognosis:1681456289}    Recommended Labs or Other Treatments After Discharge: ***    Physician Certification: I certify the above information and transfer of Ellie Maguire  is necessary for the continuing treatment of the diagnosis listed and that he requires {Admit to Appropriate Level of Care:87723} for {GREATER/LESS:498006961} 30 days.      Update Admission H&P: {CHP DME Changes in OMTMU:259035481}    PHYSICIAN SIGNATURE:  {Esignature:[id number]}

## 2020-10-01 NOTE — ED NOTES
Spoke with April at [address] home to fax over patients current med list     Natalie Felix, [address] [facility]  10/01/20 7079

## 2020-10-01 NOTE — H&P
Hospital Medicine  History and Physical    Patient:  Jaren Encarnacion  MRN: [id number]    CHIEF COMPLAINT: Shortness of breath    History Obtained From:  patient, electronic medical record  Primary Care Physician: Hanane Barajas MD    HISTORY OF PRESENT ILLNESS:   The patient is a 61 y.o. male who presents to [facility] with acute hypoxic respiratory failure with history of chronic hypoxic respiratory failure. Patient is a 10year-old male with a history of recently diagnosed idiopathic pulmonary fibrosis who remains chronically on 6 L O2 at [facility] nursing [facility]. Patient was noted by nursing staff to be severely hypoxic despite being placed on up to 8 L via nasal cannula. Patient was not placed on simple mask when oxygen was escalated up to 8 L. On arrival to the emergency department the patient stated that he felt dizzy short of breath and lightheaded. On arrival to [facility] he is T-max 98.7 respirations 29 pulse 118 blood pressure 106/62 saturating 72% on 40% FiO2. BUN 38 creatinine 2.05, patient has a known history of CKD 3 creatinine 1.7 per our records. CBC with a white count 10.2 hemoglobin 13.4 platelets 771 INR 1.0 d-dimer 0.31 COVID negative x1. Chest x-ray significant for bilateral scarring without any acute infiltrates appreciated. Patient does continue to smoke cigarettes however he states due to his dyspnea and shortness of breath he can only smoke approximately one third of a cigarette before he has to stop due to his dyspnea.     Past Medical History:      Diagnosis Date    Acidosis     Adrenal tumor     Alcohol abuse     Anemia     Anxiety     At high risk for self harm     Atherosclerotic heart disease     BPH (benign prostatic hyperplasia)     Depression     Esophageal varices (HCC)     Fatty liver     Heart failure (HCC)     Hyperlipidemia     Hypertension     Peptic ulcer     Pulmonary edema     Pulmonary fibrosis (HCC)     Thrombocytopenia (HCC)     TIA (transient ischemic attack)        Past Surgical History:      Procedure Laterality Date    APPENDECTOMY      CARDIAC CATHETERIZATION  03/06/2019    DR. VARNER    HERNIA REPAIR         Medications Prior to Admission:    Prior to Admission medications    Medication Sig Start Date End Date Taking? Authorizing Provider   LORazepam (ATIVAN) 0.5 MG tablet Take 0.5 mg by mouth every 8 hours as needed for Anxiety.    Yes Historical Provider, MD   busPIRone (BUSPAR) 5 MG tablet Take 5 mg by mouth 2 times daily   Yes Historical Provider, MD   tamsulosin (FLOMAX) 0.4 MG capsule Take 0.4 mg by mouth daily   Yes Historical Provider, MD   magnesium oxide (MAG-OX) 400 MG tablet Take 400 mg by mouth daily   Yes Historical Provider, MD   Multiple Vitamins-Minerals (THERAPEUTIC MULTIVITAMIN-MINERALS) tablet Take 1 tablet by mouth daily   Yes Historical Provider, MD   nicotine polacrilex (NICORETTE) 4 MG gum Take 4 mg by mouth as needed for Smoking cessation   Yes Historical Provider, MD   nicotine (NICODERM CQ) 14 MG/24HR Place 1 patch onto the skin every 24 hours 9/30/20 11/11/20 Yes Historical Provider, MD   Nintedanib Esylate (OFEV) 150 MG CAPS Take 150 mg by mouth 2 times daily   Yes Historical Provider, MD   potassium chloride (KLOR-CON) 20 MEQ packet Take 10 mEq by mouth daily   Yes Historical Provider, MD   pantoprazole (PROTONIX) 40 MG tablet Take 40 mg by mouth daily   Yes Historical Provider, MD   vitamin D (CHOLECALCIFEROL) 125 MCG (5000 UT) CAPS capsule Take 5,000 Units by mouth daily   Yes Historical Provider, MD   aspirin 81 MG EC tablet Take 81 mg by mouth daily 8/9/19  Yes Historical Provider, MD   atorvastatin (LIPITOR) 20 MG tablet Take 20 mg by mouth daily  6/29/20  Yes Historical Provider, MD   FLUoxetine (PROZAC) 40 MG capsule TAKE 1 CAPSULE BY MOUTH ONCE DAILY 6/9/20  Yes Historical Provider, MD   folic acid (FOLVITE) 1 MG tablet Take 1,000 mcg by mouth daily 8/9/19  Yes Historical Provider, MD   FLUoxetine (PROZAC) 20 MG capsule TAKE 1 CAPSULE BY MOUTH ONCE DAILY 6/9/20  Yes Historical Provider, MD   THIAMINE MONONITRATE PO Take 1 tablet by mouth daily 8/9/19  Yes Historical Provider, MD   furosemide (LASIX) 20 MG tablet Take 40 mg by mouth 2 times daily    Yes Historical Provider, MD   spironolactone (ALDACTONE) 50 MG tablet Take 50 mg by mouth daily    Yes Historical Provider, MD   metoprolol tartrate (LOPRESSOR) 50 MG tablet Take 50 mg by mouth 2 times daily    Yes Historical Provider, MD   losartan (COZAAR) 100 MG tablet Take 100 mg by mouth daily    Yes Historical Provider, MD       Allergies:  Patient has no known allergies. Social History:   TOBACCO:   reports that he has been smoking. He has never used smokeless tobacco.  ETOH:   reports previous alcohol use. OCCUPATION: none    Family History:   No family history on file. REVIEW OF SYSTEMS:  Ten systems reviewed and negative except for as above. Physical Exam:    Vitals: /62   Pulse 118   Temp 98.7 °F (37.1 °C) (Axillary)   Resp 29   Wt 206 lb 5.6 oz (93.6 kg)   SpO2 (!) 72%   BMI 31.38 kg/m²   Constitutional: alert, appears stated age and cooperative  Skin: Skin color, texture, turgor normal. No rashes or lesions  Eyes:Eye: Normal external eye, conjunctiva, TYRA. ENT: Head: Normocephalic, no lesions, without obvious abnormality.   Neck: no adenopathy, no carotid bruit, no JVD, supple, symmetrical, trachea midline and thyroid not enlarged, symmetric, no tenderness/mass/nodules  Respiratory: Dry rales bilaterally in the bases decreased air movement throughout with very poor air movement and decreased breath sounds  Cardiovascular: regular rate and rhythm, S1, S2 normal, no murmur, click, rub or gallop  Gastrointestinal: soft, non-tender; bowel sounds normal; no masses,  no organomegaly  Genitourinary: Deferred  Musculoskeletal:extremities normal, atraumatic, no cyanosis or edema  Neurologic: Mental status AAOx3 No facial asymmetry or droop. Normal muscle strength b/l. Psychiatric: Appropriate mood and affect. Good insight and judgement  Hematologic: No obvious bruising or bleeding    Recent Labs     10/01/20  0256   WBC 10.2   HGB 13.4*        Recent Labs     10/01/20  0256      K 3.9   CL 92*   CO2 29   BUN 38*   CREATININE 2.05*   GLUCOSE 136*   AST 22   ALT 21   BILITOT 1.0*   ALKPHOS 60     Troponin T:   Recent Labs     10/01/20  0256   TROPONINI <0.010     [id number]  Lactate: 2.7  ABGs:   Lab Results   Component Value Date    PHART 7.455 10/01/2020    PO2ART 104 10/01/2020    WXT3RTO 40 10/01/2020     INR:   Recent Labs     10/01/20  0256   INR 1.0     URINALYSIS:No results for input(s): NITRITE, COLORU, PHUR, LABCAST, WBCUA, RBCUA, MUCUS, TRICHOMONAS, YEAST, BACTERIA, CLARITYU, SPECGRAV, LEUKOCYTESUR, UROBILINOGEN, BILIRUBINUR, BLOODU, GLUCOSEU, AMORPHOUS in the last 72 hours. Invalid input(s): Carlo Nguyen  -----------------------------------------------------------------   No results found. EKG: Sinus tachycardia    Assessment and Plan   1. Acute on chronic hypoxic respiratory failure: Possible flare of pulmonary fibrosis, though cannot rule out COPD as a component of his symptomatology. Start prednisone 40 mg daily consult to pulmonology. Minimal cough or sputum production/minimal expectoration. No clear records of previous PFTs which may be of benefit outpatient. Currently on 50% FiO2 titrate down as tolerated  2. Tobacco abuse with end-stage lung disease: Extensively discussed the need for tobacco cessation. 3. JESSICA with CKD: Check urine urea urine creatinine renal ultrasound. Avoid nephrotoxic agents. 500 mL IV fluid given in ED. Follow kidney function avoid further aggressive hydration  4. Anxiety/depression: Continue home regimen antidepressants antianxiety medications once reconciled  5.  DVT prophylaxis Lovenox    Approximately 45 minutes critical care time directly providing patient care and reviewing the chart not including any procedural intervention    Patient Active Problem List   Diagnosis Code    Abnormal echocardiogram R93.1       Laura Mueller DO  Admitting Hospitalist    Emergency Contact:

## 2020-10-01 NOTE — ED NOTES
Pt's sister [facility] called and updated patient going to ICU bed 9 at [facility] [place].  Phone number to floor Billy. Rosette , [facility]  10/01/20 [id number]

## 2020-10-01 NOTE — PROGRESS NOTES
Hospitalist Progress Note      PCP: Joseph Wright MD    Date of Admission: 10/1/2020    Chief Complaint:  Afebrile, stable HD, on HFNC    Medications:  Reviewed    Infusion Medications   Scheduled Medications    ipratropium-albuterol  1 ampule Inhalation Q4H WA    predniSONE  40 mg Oral Daily    famotidine  20 mg Oral Daily     PRN Meds: promethazine **OR** ondansetron      Intake/Output Summary (Last 24 hours) at 10/1/2020 0835  Last data filed at 10/1/2020 0700  Gross per 24 hour   Intake --   Output 300 ml   Net -300 ml       Exam:    BP 94/63   Pulse 105   Temp 98.7 °F (37.1 °C) (Axillary)   Resp 24   Wt 206 lb 9.1 oz (93.7 kg)   SpO2 96%   BMI 31.41 kg/m²     General appearance: appears stated age and cooperative. Respiratory:  Diminished BS with crackles bilaterally. Cardiovascular: S1/S2, tachycardic  Abdomen: Soft, active bowel sounds. Musculoskeletal: No edema bilaterally.         Labs:   Recent Labs     10/01/20  0256   WBC 10.2   HGB 13.4*   HCT 38.9*        Recent Labs     10/01/20  0256      K 3.9   CL 92*   CO2 29   BUN 38*   CREATININE 2.05*   CALCIUM 10.8*     Recent Labs     10/01/20  0256   AST 22   ALT 21   BILITOT 1.0*   ALKPHOS 60     Recent Labs     10/01/20  0256   INR 1.0     Recent Labs     10/01/20  0256   TROPONINI <0.010       Urinalysis:    No results found for: Daniel Roulette, BACTERIA, RBCUA, BLOODU, [address], R[facility] Ray [address]94    Radiology:  2011 [facility]    (Results Pending)           Assessment/Plan:    60 y/o man NH resident with history of chronic hypoxic respiratory failure, IPF on 6 L of O2, active tobacco use who presented with:    Acute / chronic hypoxic respiratory failure  - likely due to acute excerbation of IPF  - on Prednisone, duonebs, HFNC  - management per critical care/pulmonology service    JESSICA  vs CKD3  - monitor renal function    Lactic acidosis  - mild, monitor lactate level    Tobacco use   - advised to quit    Anxiety / depression  - continue home meds      Diet: DIET GENERAL;    Code Status: DNR-CCA              Electronically signed by Keisha Chávez MD on 10/1/2020 at 8:35 AM

## 2020-10-01 NOTE — PROGRESS NOTES
10/1/20 at 10:06 AM EDT     To reach a  for emotional and spiritual support, place an [facility] consult request.   If a  is needed immediately, dial 0 and ask to page the on-call .

## 2020-10-02 LAB
ALBUMIN SERPL-MCNC: 3.8 G/DL (ref 3.5–4.6)
ANION GAP SERPL CALCULATED.3IONS-SCNC: 17 MEQ/L (ref 9–15)
BUN BLDV-MCNC: 39 MG/DL (ref 8–23)
C-REACTIVE PROTEIN, HIGH SENSITIVITY: 19.6 MG/L (ref 0–5)
CALCIUM SERPL-MCNC: 10 MG/DL (ref 8.5–9.9)
CHLORIDE BLD-SCNC: 95 MEQ/L (ref 95–107)
CO2: 23 MEQ/L (ref 20–31)
CREAT SERPL-MCNC: 1.8 MG/DL (ref 0.7–1.2)
GFR AFRICAN AMERICAN: 46.7
GFR NON-AFRICAN AMERICAN: 38.6
GLUCOSE BLD-MCNC: 218 MG/DL (ref 70–99)
HCT VFR BLD CALC: 34.7 % (ref 42–52)
HEMOGLOBIN: 12.2 G/DL (ref 14–18)
LACTIC ACID: 1 MMOL/L (ref 0.5–2.2)
MAGNESIUM: 1.6 MG/DL (ref 1.7–2.4)
MCH RBC QN AUTO: 31.8 PG (ref 27–31.3)
MCHC RBC AUTO-ENTMCNC: 35.1 % (ref 33–37)
MCV RBC AUTO: 90.9 FL (ref 80–100)
PDW BLD-RTO: 13.2 % (ref 11.5–14.5)
PHOSPHORUS: 4.1 MG/DL (ref 2.3–4.8)
PLATELET # BLD: 205 K/UL (ref 130–400)
POTASSIUM SERPL-SCNC: 4.1 MEQ/L (ref 3.4–4.9)
PROCALCITONIN: 0.16 NG/ML (ref 0–0.15)
RBC # BLD: 3.82 M/UL (ref 4.7–6.1)
SODIUM BLD-SCNC: 135 MEQ/L (ref 135–144)
WBC # BLD: 14.5 K/UL (ref 4.8–10.8)

## 2020-10-02 PROCEDURE — 99232 SBSQ HOSP IP/OBS MODERATE 35: CPT | Performed by: INTERNAL MEDICINE

## 2020-10-02 PROCEDURE — 86141 C-REACTIVE PROTEIN HS: CPT

## 2020-10-02 PROCEDURE — 1210000000 HC MED SURG R&B

## 2020-10-02 PROCEDURE — 99231 SBSQ HOSP IP/OBS SF/LOW 25: CPT | Performed by: NURSE PRACTITIONER

## 2020-10-02 PROCEDURE — 84145 PROCALCITONIN (PCT): CPT

## 2020-10-02 PROCEDURE — 94640 AIRWAY INHALATION TREATMENT: CPT

## 2020-10-02 PROCEDURE — 83605 ASSAY OF LACTIC ACID: CPT

## 2020-10-02 PROCEDURE — 80069 RENAL FUNCTION PANEL: CPT

## 2020-10-02 PROCEDURE — 2700000000 HC OXYGEN THERAPY PER DAY

## 2020-10-02 PROCEDURE — 6370000000 HC RX 637 (ALT 250 FOR IP): Performed by: INTERNAL MEDICINE

## 2020-10-02 PROCEDURE — 83735 ASSAY OF MAGNESIUM: CPT

## 2020-10-02 PROCEDURE — 36415 COLL VENOUS BLD VENIPUNCTURE: CPT

## 2020-10-02 PROCEDURE — 85027 COMPLETE CBC AUTOMATED: CPT

## 2020-10-02 PROCEDURE — 94761 N-INVAS EAR/PLS OXIMETRY MLT: CPT

## 2020-10-02 RX ORDER — LOSARTAN POTASSIUM 50 MG/1
100 TABLET ORAL DAILY
Status: DISCONTINUED | OUTPATIENT
Start: 2020-10-02 | End: 2020-10-04 | Stop reason: HOSPADM

## 2020-10-02 RX ORDER — ASPIRIN 81 MG/1
81 TABLET ORAL DAILY
Status: DISCONTINUED | OUTPATIENT
Start: 2020-10-02 | End: 2020-10-04 | Stop reason: HOSPADM

## 2020-10-02 RX ORDER — FUROSEMIDE 40 MG/1
40 TABLET ORAL 2 TIMES DAILY
Status: DISCONTINUED | OUTPATIENT
Start: 2020-10-02 | End: 2020-10-04 | Stop reason: HOSPADM

## 2020-10-02 RX ORDER — SPIRONOLACTONE 50 MG/1
50 TABLET, FILM COATED ORAL DAILY
Status: DISCONTINUED | OUTPATIENT
Start: 2020-10-02 | End: 2020-10-04 | Stop reason: HOSPADM

## 2020-10-02 RX ORDER — FOLIC ACID 1 MG/1
1000 TABLET ORAL DAILY
Status: DISCONTINUED | OUTPATIENT
Start: 2020-10-02 | End: 2020-10-04 | Stop reason: HOSPADM

## 2020-10-02 RX ORDER — FLUOXETINE HYDROCHLORIDE 20 MG/1
60 CAPSULE ORAL DAILY
Status: DISCONTINUED | OUTPATIENT
Start: 2020-10-02 | End: 2020-10-04 | Stop reason: HOSPADM

## 2020-10-02 RX ORDER — METOPROLOL TARTRATE 50 MG/1
50 TABLET, FILM COATED ORAL 2 TIMES DAILY
Status: DISCONTINUED | OUTPATIENT
Start: 2020-10-02 | End: 2020-10-04 | Stop reason: HOSPADM

## 2020-10-02 RX ORDER — LORAZEPAM 0.5 MG/1
0.5 TABLET ORAL EVERY 8 HOURS PRN
Status: DISCONTINUED | OUTPATIENT
Start: 2020-10-02 | End: 2020-10-04 | Stop reason: HOSPADM

## 2020-10-02 RX ORDER — ACETAMINOPHEN 325 MG/1
650 TABLET ORAL EVERY 6 HOURS PRN
Status: DISCONTINUED | OUTPATIENT
Start: 2020-10-02 | End: 2020-10-04 | Stop reason: HOSPADM

## 2020-10-02 RX ORDER — PANTOPRAZOLE SODIUM 40 MG/1
40 TABLET, DELAYED RELEASE ORAL DAILY
Status: DISCONTINUED | OUTPATIENT
Start: 2020-10-02 | End: 2020-10-04 | Stop reason: HOSPADM

## 2020-10-02 RX ORDER — LANOLIN ALCOHOL/MO/W.PET/CERES
400 CREAM (GRAM) TOPICAL DAILY
Status: DISCONTINUED | OUTPATIENT
Start: 2020-10-02 | End: 2020-10-04 | Stop reason: HOSPADM

## 2020-10-02 RX ORDER — TAMSULOSIN HYDROCHLORIDE 0.4 MG/1
0.4 CAPSULE ORAL DAILY
Status: DISCONTINUED | OUTPATIENT
Start: 2020-10-02 | End: 2020-10-04 | Stop reason: HOSPADM

## 2020-10-02 RX ORDER — NICOTINE 21 MG/24HR
1 PATCH, TRANSDERMAL 24 HOURS TRANSDERMAL EVERY 24 HOURS
Status: DISCONTINUED | OUTPATIENT
Start: 2020-10-02 | End: 2020-10-04 | Stop reason: HOSPADM

## 2020-10-02 RX ORDER — BUSPIRONE HYDROCHLORIDE 10 MG/1
5 TABLET ORAL 2 TIMES DAILY
Status: DISCONTINUED | OUTPATIENT
Start: 2020-10-02 | End: 2020-10-03

## 2020-10-02 RX ORDER — POTASSIUM CHLORIDE 1.5 G/1.77G
10 POWDER, FOR SOLUTION ORAL DAILY
Status: DISCONTINUED | OUTPATIENT
Start: 2020-10-02 | End: 2020-10-04 | Stop reason: HOSPADM

## 2020-10-02 RX ORDER — ATORVASTATIN CALCIUM 20 MG/1
20 TABLET, FILM COATED ORAL DAILY
Status: DISCONTINUED | OUTPATIENT
Start: 2020-10-02 | End: 2020-10-04 | Stop reason: HOSPADM

## 2020-10-02 RX ORDER — M-VIT,TX,IRON,MINS/CALC/FOLIC 27MG-0.4MG
1 TABLET ORAL DAILY
Status: DISCONTINUED | OUTPATIENT
Start: 2020-10-02 | End: 2020-10-04 | Stop reason: HOSPADM

## 2020-10-02 RX ADMIN — ATORVASTATIN CALCIUM 20 MG: 20 TABLET, FILM COATED ORAL at 14:02

## 2020-10-02 RX ADMIN — PANTOPRAZOLE SODIUM 40 MG: 40 TABLET, DELAYED RELEASE ORAL at 14:03

## 2020-10-02 RX ADMIN — FAMOTIDINE 20 MG: 20 TABLET ORAL at 08:21

## 2020-10-02 RX ADMIN — MULTIPLE VITAMINS W/ MINERALS TAB 1 TABLET: TAB at 14:03

## 2020-10-02 RX ADMIN — SPIRONOLACTONE 50 MG: 50 TABLET ORAL at 14:03

## 2020-10-02 RX ADMIN — IPRATROPIUM BROMIDE AND ALBUTEROL 1 PUFF: 20; 100 SPRAY, METERED RESPIRATORY (INHALATION) at 13:37

## 2020-10-02 RX ADMIN — BUSPIRONE HYDROCHLORIDE 5 MG: 10 TABLET ORAL at 14:03

## 2020-10-02 RX ADMIN — ACETAMINOPHEN 650 MG: 325 TABLET, FILM COATED ORAL at 04:45

## 2020-10-02 RX ADMIN — FOLIC ACID 1000 MCG: 1 TABLET ORAL at 14:02

## 2020-10-02 RX ADMIN — LOSARTAN POTASSIUM 100 MG: 50 TABLET, FILM COATED ORAL at 14:11

## 2020-10-02 RX ADMIN — IPRATROPIUM BROMIDE AND ALBUTEROL 1 PUFF: 20; 100 SPRAY, METERED RESPIRATORY (INHALATION) at 19:56

## 2020-10-02 RX ADMIN — PREDNISONE 40 MG: 10 TABLET ORAL at 08:21

## 2020-10-02 RX ADMIN — METOPROLOL TARTRATE 50 MG: 50 TABLET, FILM COATED ORAL at 21:01

## 2020-10-02 RX ADMIN — IPRATROPIUM BROMIDE AND ALBUTEROL 1 PUFF: 20; 100 SPRAY, METERED RESPIRATORY (INHALATION) at 07:16

## 2020-10-02 RX ADMIN — ASPIRIN 81 MG: 81 TABLET, COATED ORAL at 14:02

## 2020-10-02 RX ADMIN — TAMSULOSIN HYDROCHLORIDE 0.4 MG: 0.4 CAPSULE ORAL at 14:03

## 2020-10-02 RX ADMIN — FUROSEMIDE 40 MG: 40 TABLET ORAL at 14:03

## 2020-10-02 RX ADMIN — BUSPIRONE HYDROCHLORIDE 5 MG: 10 TABLET ORAL at 21:02

## 2020-10-02 RX ADMIN — ALBUTEROL SULFATE 2 PUFF: 90 AEROSOL, METERED RESPIRATORY (INHALATION) at 04:37

## 2020-10-02 RX ADMIN — FLUOXETINE 60 MG: 20 CAPSULE ORAL at 14:02

## 2020-10-02 RX ADMIN — FUROSEMIDE 40 MG: 40 TABLET ORAL at 21:02

## 2020-10-02 RX ADMIN — METOPROLOL TARTRATE 50 MG: 50 TABLET, FILM COATED ORAL at 14:11

## 2020-10-02 RX ADMIN — ACETAMINOPHEN 650 MG: 325 TABLET, FILM COATED ORAL at 16:07

## 2020-10-02 RX ADMIN — Medication 400 MG: at 14:03

## 2020-10-02 RX ADMIN — POTASSIUM CHLORIDE 10 MEQ: 1.5 POWDER, FOR SOLUTION ORAL at 14:02

## 2020-10-02 RX ADMIN — Medication 3 MG: at 21:01

## 2020-10-02 ASSESSMENT — PAIN SCALES - GENERAL
PAINLEVEL_OUTOF10: 0
PAINLEVEL_OUTOF10: 0
PAINLEVEL_OUTOF10: 9
PAINLEVEL_OUTOF10: 8

## 2020-10-02 ASSESSMENT — ENCOUNTER SYMPTOMS
SHORTNESS OF BREATH: 1
VOMITING: 0
EYES NEGATIVE: 1
DIARRHEA: 0
CONSTIPATION: 0
COUGH: 1
CHEST TIGHTNESS: 0
NAUSEA: 0
WHEEZING: 0
ALLERGIC/IMMUNOLOGIC NEGATIVE: 1
ABDOMINAL DISTENTION: 0
ABDOMINAL PAIN: 0

## 2020-10-02 NOTE — CONSULTS
Palliative Care Consult Note  Patient: Felicia Centeno  Gender: male  YOB: 1960  Unit/Bed: P906/H003-56  CodeStatus: DNR-CCA  Inpatient Treatment Team: Treatment Team: Attending Provider: Ezequiel Amaya MD; Utilization Reviewer: Ashleigh Phoenix RN; Consulting Physician: Abena Hanna MD; : Olga Padgett RN; LPN: Mich Arciniega LPN; Registered Nurse: Abhay Lee RN; Utilization Reviewer: Yasir Fam RN; : Chloe Scanlon [place]  Admit Date:  10/1/2020    Chief Complaint:  Shortness of breath and anxiety    History of Presenting Illness:      Felicia Centeno is a 61 y.o. male on hospital day 1 with a history of Acute respiratory failure. PMH is significant for Pulmonary Fibrosis, TIA, Pulmonary Edema, Peptic Ulcer, HTN, HLD, Heart Failure, Fatty liver, Esophageal varices, Depression, BPH, ASCVD, Anxiety, Anemia, Hx of Alcohol abuse,Tobacco abuse, and Adrenal tumor. Patient seen and examined at bedside for goals of care, code status, and symptom management. He was brought to the ER from the nursing home where he resides with complaints of dizziness, lightheadedness, and shortness of breath. He is on 6 liters of oxygen at the nursing home. In the ER his respirations was 29 and he was tachycardic at 118 bpm. CXR was significant for bilateral scarring without any acute infiltrates. He ws initially admitted to the ICU then transferred to the medical floor once he was stable. Patient is a current smoker. Patient observed laying in bed. He tells me that he is short of breath and that he has been anxious all night and morning. He admits to having an occasional cough. He denies any mucous production. He usually takes Ativan, Buspar, and Prozac for his depression and anxiety. Medications are still waiting to be reconciled. Discussed medications with charge nurse. He has tremors which he told me started a few years ago. He denies pain or discomfort.   Patient currently resides at [facility] in [place]. He is LTC at the facility. He is in agreement for Palliative Care Service. Review of Systems:       Review of Systems   Constitutional: Positive for activity change and fatigue. Negative for appetite change and unexpected weight change. HENT: Negative for congestion. Eyes: Negative. Respiratory: Positive for cough and shortness of breath. Negative for chest tightness and wheezing. Cardiovascular: Negative for chest pain, palpitations and leg swelling. Gastrointestinal: Negative for abdominal distention, abdominal pain, constipation, diarrhea, nausea and vomiting. Endocrine: Negative. Genitourinary: Negative for dysuria. Musculoskeletal: Positive for gait problem. Skin: Negative for pallor. Allergic/Immunologic: Negative. Neurological: Positive for tremors and weakness. Negative for dizziness. Psychiatric/Behavioral: Positive for dysphoric mood. Negative for agitation and sleep disturbance. The patient is nervous/anxious. Physical Examination:       /79   Pulse 96   Temp 97.5 °F (36.4 °C) (Oral)   Resp 17   Wt 206 lb 9.1 oz (93.7 kg)   SpO2 97%   BMI 31.41 kg/m²    Physical Exam  Vitals signs reviewed. Constitutional:       General: He is awake. He is not in acute distress. Appearance: He is well-developed. He is obese. He is not diaphoretic. Interventions: Nasal cannula in place. HENT:      Head: Normocephalic and atraumatic. Right Ear: External ear normal.      Left Ear: External ear normal.      Nose: Nose normal.      Mouth/Throat:      Pharynx: No oropharyngeal exudate. Eyes:      General: No scleral icterus. Right eye: No discharge. Left eye: No discharge. Conjunctiva/sclera: Conjunctivae normal.      Pupils: Pupils are equal, round, and reactive to light. Neck:      Musculoskeletal: Normal range of motion and neck supple. Thyroid: No thyromegaly. Vascular: No JVD.       Trachea: No BMP:    Lab Results   Component Value Date     10/02/2020    K 4.1 10/02/2020    K 3.9 10/01/2020    CL 95 10/02/2020    CO2 23 10/02/2020    BUN 39 10/02/2020    LABALBU 3.8 10/02/2020    CREATININE 1.80 10/02/2020    CALCIUM 10.0 10/02/2020    GFRAA 46.7 10/02/2020    LABGLOM 38.6 10/02/2020    GLUCOSE 218 10/02/2020     TSH: No results found for: TSH  Vitamin B12 and Folate: No components found for: FOLIC,  G82  Urinalysis:   Lab Results   Component Value Date    NITRU Negative 10/01/2020    BLOODU Negative 10/01/2020    SPECGRAV 1.020 10/01/2020    GLUCOSEU Negative 10/01/2020           FUNCTIONAL ADL´S:     Independent: [ x ] Eating, [ x  ] Dressing, [ x  ] Transferring, [ x  ] Toileting, [ x  ] Bathing, [  x ] Continence  Dependent   : [  ] Eating, [   ] Dressing, [   ] Transferring, [   ] Colby Flax, [   ] Caralyn Kalata, [   ] Continence  W. assistant : [  ] Eating, [   ] Dressing, [   ] Transferring, [   ] Colby Flax, [   ] Caralyn Kalata, [   ] Continence    Radiology: Reviewed      Xr Chest Portable    Result Date: 10/1/2020  XR CHEST PORTABLE : 10/1/2020 CLINICAL HISTORY:  sob . COMPARISON: 9/12/2020. TECHNIQUE: An upright portable AP radiograph of the chest was obtained. FINDINGS: Shallow inspiratory volumes are present with moderate predominantly peripheral coarsening of the bronchovascular structures suggestive of interstitial lung disease, not significantly changed from 9/12/2020. Superimposed interstitial edema, atypical pneumonia, or other less likely possibilities should be considered. There is no cardiomegaly, significant pleural effusion, gross vascular congestion, pneumothorax, or displaced fractures identified. STABLE CHEST FROM 9/12/2020 SUGGESTING MODERATE INTERSTITIAL LUNG DISEASE SUCH AS UIP. INTERSTITIAL EDEMA, ATYPICAL PNEUMONIA, OR OTHER LESS LIKELY POSSIBILITIES ARE ALSO CONSIDERATIONS.      Us Retroperitoneal Limited    Result Date: 10/1/2020  EXAMINATION: US RETROPERITONEAL LIMITED DATE AND TIME:10/1/2020 12:52 PM CLINICAL HISTORY: HYDRONEPHROSIS. JESSICA  COMPARISONS:  NONE FINDINGS: Right kidney 10.4 cm. Left kidney. 11.2 cm. Echogenicity The echogenicity of renal cortex may be slightly increased but still is not greater than the liver. Hydronephrosis There is no hydronephrosis. Mass: 1 x 1 cm exophytic hypoechoic lesion projecting off the lower pole of the left kidney favoring a small cyst. Consider correlation with CT. If no CT is performed, a 6 month follow-up ultrasound recommended. PROBABLE 1 CM LEFT LEFT RENAL CYST. THERE  MILD INCREASED ECHOGENICITY OF THE KIDNEYS. Assessment and plan:    1. Shortness of breath in the setting of Acute on chronic hypoxic respiratory failure with possible COPD exacerbation  -Currently on prednisone  -He is on high flow oxygen  -Pulmonology consulted    2. Anxiety  3. Depression  -Control with Buspar and Prozac which needs to reconciled    4. Current tobacco user  -States that he smokes less than half of a cigarette daily due to his dyspnea    5. JESSICA on CKD  -Avoid nephrotoxic medications when necessary  -Gentle hydration    -Palliative Care Encounter  -Met with patient at bedside. We discussed palliative care in terms of his disease process. [facility] is 61years old with multiple comorbidities which includes Pulmonary Fibrosis, TIA, Pulmonary Edema, Peptic Ulcer, HTN, HLD, Heart Failure, Fatty liver, Esophageal varices, Depression, BPH, ASCVD, Anxiety, Anemia, Hx of Alcohol abuse,Tobacco abuse, and Adrenal tumor. Considering his comorbidities and his risk for rehospitalization, he is appropriate for Palliative Care Services.  -Palliative care will continue to follow patient.    -Advance Care Planning  Discussed goals of care with patient. Explained in extensive detail nuances between full code, DNR CCA and DNR CC.  Patient has made the decision to be DNR CCA      -Goals of Care Discussion:  Disease process and goals of treatment were discussed in basic terms. Naeem Griffith' goal is to optimize available comfort care measures to decrease hospitalization and prevent ER visits, manage symptomology with future Palliative Care Services. We discussed the palliative care philosophy in light of those goals. We discussed all care options contingent on treatment response and QOL. Much active listening, presence, and emotional support were given.      Electronically signed by ANA Buckner CNP on 10/2/2020 at 11:11 AM

## 2020-10-02 NOTE — CARE COORDINATION
The patient is from [facility] and the discharge plan is for the patient to return. The patient is a bed hold and long term per [address] [place] [address].  Electronically signed by PANCHITO Alcanatr on 10/2/20 at 10:17 AM EDT

## 2020-10-02 NOTE — PROGRESS NOTES
Hospitalist Progress Note      PCP: Phil Coffey MD    Date of Admission: 10/1/2020    Chief Complaint:  Transferred out of ICU, afebrile, stable HD, feels short of breath with minimal activity, on VM 50%    Medications:  Reviewed    Infusion Medications   Scheduled Medications    aspirin  81 mg Oral Daily    atorvastatin  20 mg Oral Daily    busPIRone  5 mg Oral BID    FLUoxetine  60 mg Oral Daily    folic acid  3,428 mcg Oral Daily    furosemide  40 mg Oral BID    losartan  100 mg Oral Daily    magnesium oxide  400 mg Oral Daily    metoprolol tartrate  50 mg Oral BID    therapeutic multivitamin-minerals  1 tablet Oral Daily    nicotine  1 patch Transdermal Q24H    Nintedanib Esylate  150 mg Oral BID    pantoprazole  40 mg Oral Daily    potassium chloride  10 mEq Oral Daily    spironolactone  50 mg Oral Daily    tamsulosin  0.4 mg Oral Daily    predniSONE  40 mg Oral Daily    albuterol-ipratropium  1 puff Inhalation TID     PRN Meds: acetaminophen, LORazepam, promethazine **OR** ondansetron, albuterol sulfate HFA, melatonin      Intake/Output Summary (Last 24 hours) at 10/2/2020 1220  Last data filed at 10/2/2020 0504  Gross per 24 hour   Intake 650 ml   Output 550 ml   Net 100 ml       Exam:    /79   Pulse 96   Temp 97.5 °F (36.4 °C) (Oral)   Resp 17   Wt 206 lb 9.1 oz (93.7 kg)   SpO2 97%   BMI 31.41 kg/m²     General appearance: appears stated age and cooperative. Respiratory:  Diminished BS with crackles bilaterally. Cardiovascular: S1/S2, tachycardic  Abdomen: Soft, active bowel sounds. Musculoskeletal: No edema bilaterally.         Labs:   Recent Labs     10/01/20  0256 10/02/20  0634   WBC 10.2 14.5*   HGB 13.4* 12.2*   HCT 38.9* 34.7*    205     Recent Labs     10/01/20  0256 10/02/20  0634    135   K 3.9 4.1   CL 92* 95   CO2 29 23   BUN 38* 39*   CREATININE 2.05* 1.80*   CALCIUM 10.8* 10.0*   PHOS  --  4.1     Recent Labs     10/01/20  0256   AST 22   ALT 21

## 2020-10-02 NOTE — PROGRESS NOTES
2245 - Pt. Here from ICU. Transferred into room 465 via bed with assistance of transporter. Per report, pt. was previously a COVID rule out with isolation Precautions d/c'd following two negative results. Pt. Placed on 40% Venti mask. Vitals obtained. BP 99/57 which pt. states is baseline for him. , Spo2 94% on Venti mask, and afebrile. Pt. Able to turn self and is currently on R side with HOB flat. During my assessment, pt's lungs were clear and diminished bilaterally. Pt. C/o SOB. RT called for PRN treatment. Pt. Requested box fan for his room. One brought by Maintenance and setup in pt's room. External male catheter in place and draining well. Secured to pt's R leg via securement device. Skin is intact. Pt. Denies pain and nausea. Denies wanting a snack at this time. General diet ordered. Pt. Asking about whether he can have his PRN Ativan at this time. Checked MAR. No current orders for this medication. Checked pt's home med list.  List reviewed by previous nurse. Medication is listed as pt. Stated. Will message Hospitalist for orders. Falls precaution in place. Bed alarm on. Call light within reach. Pt. States he is very weak and upon standing he has previously became dizzy and fallen. Last fall, per the pt., was 10/1 prior to coming to the ED. Will continue to monitor.

## 2020-10-02 NOTE — PROGRESS NOTES
INPATIENT PROGRESS NOTES    PATIENT NAME: Allen Diaz  MRN: [id number]  SERVICE DATE:  October 2, 2020   SERVICE TIME:  11:37 AM      PRIMARY SERVICE: Pulmonary Disease    CHIEF COMPLAINTS: Shortness of breath    INTERVAL HPI: Patient seen and examined at bedside, Interval Notes, orders reviewed. Nursing notes noted    Patient report still short of breath, no change in his symptoms, still has dizziness and presyncopal symptoms with activity, no chest pain, no fever, no nausea no vomiting, has mild dry cough. Review of system:     GI Abdominal pain No  Skin Rash No    Social History     Tobacco Use    Smoking status: Current Some Day Smoker    Smokeless tobacco: Never Used    Tobacco comment: 3 small puff of cig a day   Substance Use Topics    Alcohol use: Not Currently     Comment: Since March 2020     No family history on file. OBJECTIVE    Body mass index is 31.41 kg/m². PHYSICAL EXAM:  Vitals:  /79   Pulse 96   Temp 97.5 °F (36.4 °C) (Oral)   Resp 17   Wt 206 lb 9.1 oz (93.7 kg)   SpO2 97%   BMI 31.41 kg/m²     General: alert, cooperative, no distress  Head: normocephalic, atraumatic  Eyes:No gross abnormalities. ENT:  MMM no lesions  Neck:  supple and no masses  Chest : Diffuse rales, no wheezing, nontender, tympanic  Heart[de-identified] Heart sounds are normal.  Regular rate and rhythm without murmur, gallop or rub. ABD:  symmetric, soft, non-tender  Musculoskeletal : no cyanosis, no clubbing and no edema  Neuro:  Grossly normal  Skin: No rashes or nodules noted.   Lymph node:  no cervical nodes  Urology: No Boyd   Psychiatric: appropriate    DATA:   Recent Labs     10/01/20  0256 10/02/20  0634   WBC 10.2 14.5*   HGB 13.4* 12.2*   HCT 38.9* 34.7*   MCV 92.6 90.9    205     Recent Labs     10/01/20  0256 10/02/20  0634    135   K 3.9 4.1   CL 92* 95   CO2 29 23   BUN 38* 39*   CREATININE 2.05* 1.80*   GLUCOSE 136* 218*   CALCIUM 10.8* 10.0*   PROT 8.4*  --    LABALBU 4.2 3.8 QUESTION OF PULMONARY VASCULAR CONGESTED WITH INCREASED INTERSTITIAL MARKINGS VERSUS CHRONIC INTERSTITIAL CHANGES. Cherl Spinner QUESTION OF PATCHY MULTIFOCAL TO COALESCENT AIRSPACE DISEASE THROUGHOUT SUPERIMPOSED UPON THE LUNG PARENCHYMA. ... THE CONSTELLATION OF FINDINGS RECOMMEND CT SCAN THE CHEST WITHOUT IV CONTRAST TO FURTHER EVALUATE    Us Retroperitoneal Limited    Result Date: 10/1/2020  EXAMINATION: US RETROPERITONEAL LIMITED DATE AND TIME:10/1/2020 12:52 PM CLINICAL HISTORY: HYDRONEPHROSIS. JESSICA  COMPARISONS:  NONE FINDINGS: Right kidney 10.4 cm. Left kidney. 11.2 cm. Echogenicity The echogenicity of renal cortex may be slightly increased but still is not greater than the liver. Hydronephrosis There is no hydronephrosis. Mass: 1 x 1 cm exophytic hypoechoic lesion projecting off the lower pole of the left kidney favoring a small cyst. Consider correlation with CT. If no CT is performed, a 6 month follow-up ultrasound recommended. PROBABLE 1 CM LEFT LEFT RENAL CYST. THERE  MILD INCREASED ECHOGENICITY OF THE KIDNEYS.              IMPRESSION AND SUGGESTION:  Patient is at risk due to   · Acute on chronic hypoxic respiratory failure secondary to  · Underlying severe IPF with progression  · Presyncopal event likely due to severe hypoxia on exertion    Recommendations  · Resume home Ofev  · Continue prednisone  · O2 to keep sat 90 to 92%  · Patient will need further work-up, including echo evaluate RVSP however he does have pulmonologist at [facility]  · Needs to consider lung transplant evaluation, initially he refused that as per the patient, now he is more open to the idea he will call his primary pulmonologist to schedule an earlier appointment for lung transplant referral.  · No records regarding patient's history at [facility]       Electronically signed by Jaquan Huynh MD,  [facility]   on 10/2/2020 at 11:37 AM

## 2020-10-02 NOTE — PROGRESS NOTES
8330 - Message sent to Dr. Stacey Dow, concerning pt. Wanting his PRN Ativan, previously available on his home medication list.  Dr. Estefania Mccullough it is not advisable to administer this medication given pt's current respiratory state.  ordered a sleep aide instead. See MAR.      6228 - Pt. Updated with Dr's response and verbalized understanding. Sleep aide administered at pt's request.  Denies further needs at this time.

## 2020-10-02 NOTE — FLOWSHEET NOTE
1900- shift handoff Rhiannon COSTA     2000- assessment complete. AOx4, lung sound bilateral clear and diminshed all lobes, pupils equal and reactive. Pt follows commands. 2101- Called report to .  Pt belongings noted on pt belong list gathered and sent with pt to     2240- pt left unit for 4W with pt ally

## 2020-10-03 ENCOUNTER — APPOINTMENT (OUTPATIENT)
Dept: CT IMAGING | Age: 60
DRG: 196 | End: 2020-10-03
Attending: INTERNAL MEDICINE
Payer: MEDICARE

## 2020-10-03 LAB
ALBUMIN SERPL-MCNC: 3.7 G/DL (ref 3.5–4.6)
ANION GAP SERPL CALCULATED.3IONS-SCNC: 13 MEQ/L (ref 9–15)
BUN BLDV-MCNC: 47 MG/DL (ref 8–23)
CALCIUM SERPL-MCNC: 10.1 MG/DL (ref 8.5–9.9)
CHLORIDE BLD-SCNC: 96 MEQ/L (ref 95–107)
CO2: 27 MEQ/L (ref 20–31)
CREAT SERPL-MCNC: 1.7 MG/DL (ref 0.7–1.2)
GFR AFRICAN AMERICAN: 49.9
GFR NON-AFRICAN AMERICAN: 41.2
GLUCOSE BLD-MCNC: 129 MG/DL (ref 70–99)
HCT VFR BLD CALC: 35.4 % (ref 42–52)
HEMOGLOBIN: 11.9 G/DL (ref 14–18)
MAGNESIUM: 1.8 MG/DL (ref 1.7–2.4)
MCH RBC QN AUTO: 31.3 PG (ref 27–31.3)
MCHC RBC AUTO-ENTMCNC: 33.6 % (ref 33–37)
MCV RBC AUTO: 93.1 FL (ref 80–100)
PDW BLD-RTO: 13.4 % (ref 11.5–14.5)
PHOSPHORUS: 4.3 MG/DL (ref 2.3–4.8)
PLATELET # BLD: 232 K/UL (ref 130–400)
POTASSIUM SERPL-SCNC: 4.2 MEQ/L (ref 3.4–4.9)
RBC # BLD: 3.8 M/UL (ref 4.7–6.1)
SODIUM BLD-SCNC: 136 MEQ/L (ref 135–144)
WBC # BLD: 16.7 K/UL (ref 4.8–10.8)

## 2020-10-03 PROCEDURE — 2700000000 HC OXYGEN THERAPY PER DAY

## 2020-10-03 PROCEDURE — 94761 N-INVAS EAR/PLS OXIMETRY MLT: CPT

## 2020-10-03 PROCEDURE — 6370000000 HC RX 637 (ALT 250 FOR IP): Performed by: INTERNAL MEDICINE

## 2020-10-03 PROCEDURE — 36415 COLL VENOUS BLD VENIPUNCTURE: CPT

## 2020-10-03 PROCEDURE — 93010 ELECTROCARDIOGRAM REPORT: CPT | Performed by: INTERNAL MEDICINE

## 2020-10-03 PROCEDURE — 1210000000 HC MED SURG R&B

## 2020-10-03 PROCEDURE — 99232 SBSQ HOSP IP/OBS MODERATE 35: CPT | Performed by: INTERNAL MEDICINE

## 2020-10-03 PROCEDURE — 71250 CT THORAX DX C-: CPT

## 2020-10-03 PROCEDURE — 85027 COMPLETE CBC AUTOMATED: CPT

## 2020-10-03 PROCEDURE — 94640 AIRWAY INHALATION TREATMENT: CPT

## 2020-10-03 PROCEDURE — 83735 ASSAY OF MAGNESIUM: CPT

## 2020-10-03 PROCEDURE — 80069 RENAL FUNCTION PANEL: CPT

## 2020-10-03 RX ORDER — BUSPIRONE HYDROCHLORIDE 10 MG/1
5 TABLET ORAL 3 TIMES DAILY
Status: DISCONTINUED | OUTPATIENT
Start: 2020-10-03 | End: 2020-10-04 | Stop reason: HOSPADM

## 2020-10-03 RX ADMIN — FOLIC ACID 1000 MCG: 1 TABLET ORAL at 11:11

## 2020-10-03 RX ADMIN — IPRATROPIUM BROMIDE AND ALBUTEROL 1 PUFF: 20; 100 SPRAY, METERED RESPIRATORY (INHALATION) at 20:04

## 2020-10-03 RX ADMIN — FUROSEMIDE 40 MG: 40 TABLET ORAL at 11:08

## 2020-10-03 RX ADMIN — Medication 3 MG: at 21:14

## 2020-10-03 RX ADMIN — SPIRONOLACTONE 50 MG: 50 TABLET ORAL at 11:08

## 2020-10-03 RX ADMIN — ASPIRIN 81 MG: 81 TABLET, COATED ORAL at 11:06

## 2020-10-03 RX ADMIN — BUSPIRONE HYDROCHLORIDE 5 MG: 10 TABLET ORAL at 11:11

## 2020-10-03 RX ADMIN — Medication 400 MG: at 11:10

## 2020-10-03 RX ADMIN — POTASSIUM CHLORIDE 10 MEQ: 1.5 POWDER, FOR SOLUTION ORAL at 11:07

## 2020-10-03 RX ADMIN — ATORVASTATIN CALCIUM 20 MG: 20 TABLET, FILM COATED ORAL at 21:13

## 2020-10-03 RX ADMIN — FLUOXETINE 60 MG: 20 CAPSULE ORAL at 11:09

## 2020-10-03 RX ADMIN — IPRATROPIUM BROMIDE AND ALBUTEROL 1 PUFF: 20; 100 SPRAY, METERED RESPIRATORY (INHALATION) at 11:40

## 2020-10-03 RX ADMIN — BUSPIRONE HYDROCHLORIDE 5 MG: 10 TABLET ORAL at 21:13

## 2020-10-03 RX ADMIN — PANTOPRAZOLE SODIUM 40 MG: 40 TABLET, DELAYED RELEASE ORAL at 11:10

## 2020-10-03 RX ADMIN — IPRATROPIUM BROMIDE AND ALBUTEROL 1 PUFF: 20; 100 SPRAY, METERED RESPIRATORY (INHALATION) at 07:43

## 2020-10-03 RX ADMIN — PREDNISONE 40 MG: 10 TABLET ORAL at 11:09

## 2020-10-03 RX ADMIN — MULTIPLE VITAMINS W/ MINERALS TAB 1 TABLET: TAB at 11:11

## 2020-10-03 RX ADMIN — TAMSULOSIN HYDROCHLORIDE 0.4 MG: 0.4 CAPSULE ORAL at 11:08

## 2020-10-03 RX ADMIN — FUROSEMIDE 40 MG: 40 TABLET ORAL at 21:13

## 2020-10-03 ASSESSMENT — PAIN SCALES - GENERAL
PAINLEVEL_OUTOF10: 0

## 2020-10-03 NOTE — PROGRESS NOTES
Assessment completed this shift. Alert and oriented x4. Denies pain or nausea. SOB with minimal exertion. Pt up to cart for testing and pulse ox dropped to 60s on 50% Venti mask. Went back up to 90s after a few minutes. Dr Jeimy Soto aware. pt now back in bed on high flow oxygen with call light in reach.   Electronically signed by Isaac Gupta RN on 10/3/2020 at 5:19 PM

## 2020-10-03 NOTE — PROGRESS NOTES
INPATIENT PROGRESS NOTES    PATIENT NAME: Dalila Mello  MRN: [id number]  SERVICE DATE:  October 3, 2020   SERVICE TIME:  2:37 PM      PRIMARY SERVICE: Pulmonary Disease    CHIEF COMPLAINTS: Shortness of breath    INTERVAL HPI: Patient seen and examined at bedside, Interval Notes, orders reviewed. Nursing notes noted    Still short of breath, not feeling better, no cough, still get dizzy with exertion, no fever, no nausea no vomiting, no fever overnight, she is on 12 L oxygen    Review of system:     GI Abdominal pain No  Skin Rash No    Social History     Tobacco Use    Smoking status: Current Some Day Smoker    Smokeless tobacco: Never Used    Tobacco comment: 3 small puff of cig a day   Substance Use Topics    Alcohol use: Not Currently     Comment: Since March 2020     No family history on file. OBJECTIVE    Body mass index is 31.41 kg/m². PHYSICAL EXAM:  Vitals:  /64   Pulse 77   Temp 97.5 °F (36.4 °C) (Oral)   Resp 19   Wt 206 lb 9.1 oz (93.7 kg)   SpO2 95%   BMI 31.41 kg/m²     General: alert, cooperative, no distress  Head: normocephalic, atraumatic  Eyes:No gross abnormalities. ENT:  MMM no lesions  Neck:  supple and no masses  Chest : Bilateral rales, good air movement, no wheezing, nontender, tympanic  Heart[de-identified] Heart sounds are normal.  Regular rate and rhythm without murmur, gallop or rub. ABD:  symmetric, soft, non-tender  Musculoskeletal : no cyanosis, no clubbing and no edema  Neuro:  Grossly normal  Skin: No rashes or nodules noted.   Lymph node:  no cervical nodes  Urology: No Boyd   Psychiatric: appropriate    DATA:   Recent Labs     10/02/20  0634 10/03/20  0850   WBC 14.5* 16.7*   HGB 12.2* 11.9*   HCT 34.7* 35.4*   MCV 90.9 93.1    232     Recent Labs     10/01/20  0256 10/02/20  0634 10/03/20  0850    135 136   K 3.9 4.1 4.2   CL 92* 95 96   CO2 29 23 27   BUN 38* 39* 47*   CREATININE 2.05* 1.80* 1.70*   GLUCOSE 136* 218* 129*   CALCIUM 10.8* 10.0* 10.1* PROT 8.4*  --   --    LABALBU 4.2 3.8 3.7   BILITOT 1.0*  --   --    ALKPHOS 60  --   --    AST 22  --   --    ALT 21  --   --    LABGLOM 33.2* 38.6* 41.2*   GFRAA 40.2* 46.7* 49.9*   GLOB 4.2*  --   --        MV Settings:     FiO2 : 50 %    Recent Labs     10/01/20  0308   PHART 7.455*   [id number] 40   PO2ART 104*   YES5RWU 27.8   BEART 4   [id number] 98*       O2 Device: Air entrainment mask  O2 Flow Rate (L/min): 12 L/min    DIET GENERAL;     MEDICATIONS during current hospitalization:    Continuous Infusions:    Scheduled Meds:   aspirin  81 mg Oral Daily    atorvastatin  20 mg Oral Daily    busPIRone  5 mg Oral BID    FLUoxetine  60 mg Oral Daily    folic acid  8,382 mcg Oral Daily    furosemide  40 mg Oral BID    losartan  100 mg Oral Daily    magnesium oxide  400 mg Oral Daily    metoprolol tartrate  50 mg Oral BID    therapeutic multivitamin-minerals  1 tablet Oral Daily    nicotine  1 patch Transdermal Q24H    Nintedanib Esylate  150 mg Oral BID    pantoprazole  40 mg Oral Daily    potassium chloride  10 mEq Oral Daily    spironolactone  50 mg Oral Daily    tamsulosin  0.4 mg Oral Daily    predniSONE  40 mg Oral Daily    albuterol-ipratropium  1 puff Inhalation TID       PRN Meds:acetaminophen, LORazepam, promethazine **OR** ondansetron, albuterol sulfate HFA, melatonin    Radiology  Xr Chest Portable    Result Date: 10/1/2020  XR CHEST PORTABLE : 10/1/2020 CLINICAL HISTORY:  sob . COMPARISON: 9/12/2020. TECHNIQUE: An upright portable AP radiograph of the chest was obtained. FINDINGS: Shallow inspiratory volumes are present with moderate predominantly peripheral coarsening of the bronchovascular structures suggestive of interstitial lung disease, not significantly changed from 9/12/2020. Superimposed interstitial edema, atypical pneumonia, or other less likely possibilities should be considered.  There is no cardiomegaly, significant pleural effusion, gross vascular congestion, pneumothorax, or displaced fractures identified. STABLE CHEST FROM 9/12/2020 SUGGESTING MODERATE INTERSTITIAL LUNG DISEASE SUCH AS UIP. INTERSTITIAL EDEMA, ATYPICAL PNEUMONIA, OR OTHER LESS LIKELY POSSIBILITIES ARE ALSO CONSIDERATIONS. Xr Chest Portable    Result Date: 9/12/2020  EXAMINATION: CHEST PORTABLE VIEW  CLINICAL HISTORY: Short of breath COMPARISONS: None  FINDINGS: Single  views of the chest is submitted. The cardiac silhouette is enlarged . Pulmonary vascular congested with increased interstitial markings. Right sided trachea. There is patchy multifocal to coalescent airspace disease throughout the lung parenchyma. .  No Pneumothoraces. QUESTION OF PULMONARY VASCULAR CONGESTED WITH INCREASED INTERSTITIAL MARKINGS VERSUS CHRONIC INTERSTITIAL CHANGES. Bula Dayhoff QUESTION OF PATCHY MULTIFOCAL TO COALESCENT AIRSPACE DISEASE THROUGHOUT SUPERIMPOSED UPON THE LUNG PARENCHYMA. ... THE CONSTELLATION OF FINDINGS RECOMMEND CT SCAN THE CHEST WITHOUT IV CONTRAST TO FURTHER EVALUATE    Us Retroperitoneal Limited    Result Date: 10/1/2020  EXAMINATION: US RETROPERITONEAL LIMITED DATE AND TIME:10/1/2020 12:52 PM CLINICAL HISTORY: HYDRONEPHROSIS. JESSICA  COMPARISONS:  NONE FINDINGS: Right kidney 10.4 cm. Left kidney. 11.2 cm. Echogenicity The echogenicity of renal cortex may be slightly increased but still is not greater than the liver. Hydronephrosis There is no hydronephrosis. Mass: 1 x 1 cm exophytic hypoechoic lesion projecting off the lower pole of the left kidney favoring a small cyst. Consider correlation with CT. If no CT is performed, a 6 month follow-up ultrasound recommended. PROBABLE 1 CM LEFT LEFT RENAL CYST. THERE  MILD INCREASED ECHOGENICITY OF THE KIDNEYS.              IMPRESSION AND SUGGESTION:  Patient is at risk due to   · Acute on chronic hypoxic respiratory failure secondary to  · Underlying severe IPF with progression  · Presyncopal event likely due to severe hypoxia on exertion    Recommendations    · Continue prednisone  · Resume home Ofev  · O2 to keep sat 90 to 92%, will use high flow oxygen  · Bedside commode  · Echo  · CT chest  · Will need lung transplant at some point if qualifies, he will discuss with his pulmonologist       Electronically signed by Waqar Su MD,  [facility]   on 10/3/2020 at 2:37 PM

## 2020-10-03 NOTE — PROGRESS NOTES
Hospitalist Progress Note      PCP: Belkys Rocha MD    Date of Admission: 10/1/2020    Chief Complaint:  Transferred out of ICU, afebrile, stable HD, feels short of breath with minimal activity, on VM 50%    Medications:  Reviewed    Infusion Medications   Scheduled Medications    aspirin  81 mg Oral Daily    atorvastatin  20 mg Oral Daily    busPIRone  5 mg Oral BID    FLUoxetine  60 mg Oral Daily    folic acid  2,930 mcg Oral Daily    furosemide  40 mg Oral BID    losartan  100 mg Oral Daily    magnesium oxide  400 mg Oral Daily    metoprolol tartrate  50 mg Oral BID    therapeutic multivitamin-minerals  1 tablet Oral Daily    nicotine  1 patch Transdermal Q24H    Nintedanib Esylate  150 mg Oral BID    pantoprazole  40 mg Oral Daily    potassium chloride  10 mEq Oral Daily    spironolactone  50 mg Oral Daily    tamsulosin  0.4 mg Oral Daily    predniSONE  40 mg Oral Daily    albuterol-ipratropium  1 puff Inhalation TID     PRN Meds: acetaminophen, LORazepam, promethazine **OR** ondansetron, albuterol sulfate HFA, melatonin      Intake/Output Summary (Last 24 hours) at 10/3/2020 1228  Last data filed at 10/3/2020 0524  Gross per 24 hour   Intake 440 ml   Output 1500 ml   Net -1060 ml       Exam:    /64   Pulse 77   Temp 97.5 °F (36.4 °C) (Oral)   Resp 19   Wt 206 lb 9.1 oz (93.7 kg)   SpO2 95%   BMI 31.41 kg/m²     General appearance: appears stated age and cooperative. Respiratory:  Diminished BS with crackles bilaterally. Cardiovascular: S1/S2, tachycardic  Abdomen: Soft, active bowel sounds. Musculoskeletal: No edema bilaterally.         Labs:   Recent Labs     10/01/20  0256 10/02/20  0634 10/03/20  0850   WBC 10.2 14.5* 16.7*   HGB 13.4* 12.2* 11.9*   HCT 38.9* 34.7* 35.4*    205 232     Recent Labs     10/01/20  0256 10/02/20  0634 10/03/20  0850    135 136   K 3.9 4.1 4.2   CL 92* 95 96   CO2 29 23 27   BUN 38* 39* 47*   CREATININE 2.05* 1.80* 1.70*   CALCIUM 10.8* 10.0* 10.1*   PHOS  --  4.1 4.3     Recent Labs     10/01/20  0256   AST 22   ALT 21   BILITOT 1.0*   ALKPHOS 60     Recent Labs     10/01/20  0256   INR 1.0     Recent Labs     10/01/20  0256   TROPONINI <0.010       Urinalysis:      Lab Results   Component Value Date    NITRU Negative 10/01/2020    BLOODU Negative 10/01/2020    SPECGRAV 1.020 10/01/2020    GLUCOSEU Negative 10/01/2020       Radiology:  US RETROPERITONEAL LIMITED   Final Result   PROBABLE 1 CM LEFT LEFT RENAL CYST. THERE  MILD INCREASED ECHOGENICITY OF THE KIDNEYS.                     Assessment/Plan:    62 y/o man NH resident with history of chronic hypoxic respiratory failure, IPF on 6 L of O2, active tobacco use who presented with:    Acute / chronic hypoxic respiratory failure  - likely due to acute excerbation of IPF  - on Prednisone, duonebs, HFNC  - resumed Nintedanib  - management per critical care/pulmonology service    CKD3  - monitor renal function    Lactic acidosis  - resolved    Tobacco use   - advised to quit  - nicotine patch    HTN  - continue home meds    Anxiety / depression  - continue home meds      Diet: DIET GENERAL;    Code Status: DNR-CCA    Disposition - back to his NH when clinically improved          Electronically signed by Samantha Manuel MD on 10/3/2020 at 12:28 PM

## 2020-10-04 VITALS
WEIGHT: 206.57 LBS | OXYGEN SATURATION: 100 % | DIASTOLIC BLOOD PRESSURE: 57 MMHG | RESPIRATION RATE: 18 BRPM | HEART RATE: 63 BPM | TEMPERATURE: 97.7 F | BODY MASS INDEX: 31.41 KG/M2 | SYSTOLIC BLOOD PRESSURE: 94 MMHG

## 2020-10-04 LAB
ALBUMIN SERPL-MCNC: 3.8 G/DL (ref 3.5–4.6)
ANION GAP SERPL CALCULATED.3IONS-SCNC: 12 MEQ/L (ref 9–15)
BUN BLDV-MCNC: 42 MG/DL (ref 8–23)
CALCIUM SERPL-MCNC: 10 MG/DL (ref 8.5–9.9)
CHLORIDE BLD-SCNC: 96 MEQ/L (ref 95–107)
CO2: 26 MEQ/L (ref 20–31)
CREAT SERPL-MCNC: 1.36 MG/DL (ref 0.7–1.2)
GFR AFRICAN AMERICAN: >60
GFR NON-AFRICAN AMERICAN: 53.3
GLUCOSE BLD-MCNC: 93 MG/DL (ref 70–99)
HCT VFR BLD CALC: 32.8 % (ref 42–52)
HEMOGLOBIN: 11.5 G/DL (ref 14–18)
MAGNESIUM: 1.8 MG/DL (ref 1.7–2.4)
MCH RBC QN AUTO: 32.1 PG (ref 27–31.3)
MCHC RBC AUTO-ENTMCNC: 35.2 % (ref 33–37)
MCV RBC AUTO: 91.4 FL (ref 80–100)
PDW BLD-RTO: 13.6 % (ref 11.5–14.5)
PHOSPHORUS: 4.3 MG/DL (ref 2.3–4.8)
PLATELET # BLD: 225 K/UL (ref 130–400)
POTASSIUM SERPL-SCNC: 4.1 MEQ/L (ref 3.4–4.9)
RBC # BLD: 3.59 M/UL (ref 4.7–6.1)
SODIUM BLD-SCNC: 134 MEQ/L (ref 135–144)
WBC # BLD: 13.1 K/UL (ref 4.8–10.8)

## 2020-10-04 PROCEDURE — 6370000000 HC RX 637 (ALT 250 FOR IP): Performed by: INTERNAL MEDICINE

## 2020-10-04 PROCEDURE — 2700000000 HC OXYGEN THERAPY PER DAY

## 2020-10-04 PROCEDURE — 99233 SBSQ HOSP IP/OBS HIGH 50: CPT | Performed by: INTERNAL MEDICINE

## 2020-10-04 PROCEDURE — 85027 COMPLETE CBC AUTOMATED: CPT

## 2020-10-04 PROCEDURE — 6360000002 HC RX W HCPCS: Performed by: INTERNAL MEDICINE

## 2020-10-04 PROCEDURE — 94761 N-INVAS EAR/PLS OXIMETRY MLT: CPT

## 2020-10-04 PROCEDURE — 94664 DEMO&/EVAL PT USE INHALER: CPT

## 2020-10-04 PROCEDURE — 80069 RENAL FUNCTION PANEL: CPT

## 2020-10-04 PROCEDURE — 83735 ASSAY OF MAGNESIUM: CPT

## 2020-10-04 PROCEDURE — 94640 AIRWAY INHALATION TREATMENT: CPT

## 2020-10-04 PROCEDURE — 36415 COLL VENOUS BLD VENIPUNCTURE: CPT

## 2020-10-04 RX ORDER — METHYLPREDNISOLONE SODIUM SUCCINATE 40 MG/ML
40 INJECTION, POWDER, LYOPHILIZED, FOR SOLUTION INTRAMUSCULAR; INTRAVENOUS EVERY 8 HOURS
Status: DISCONTINUED | OUTPATIENT
Start: 2020-10-04 | End: 2020-10-04 | Stop reason: HOSPADM

## 2020-10-04 RX ADMIN — PREDNISONE 40 MG: 10 TABLET ORAL at 10:03

## 2020-10-04 RX ADMIN — ASPIRIN 81 MG: 81 TABLET, COATED ORAL at 10:04

## 2020-10-04 RX ADMIN — IPRATROPIUM BROMIDE AND ALBUTEROL 1 PUFF: 20; 100 SPRAY, METERED RESPIRATORY (INHALATION) at 14:15

## 2020-10-04 RX ADMIN — FLUOXETINE 60 MG: 20 CAPSULE ORAL at 10:04

## 2020-10-04 RX ADMIN — METHYLPREDNISOLONE SODIUM SUCCINATE 40 MG: 40 INJECTION, POWDER, FOR SOLUTION INTRAMUSCULAR; INTRAVENOUS at 15:09

## 2020-10-04 RX ADMIN — FOLIC ACID 1000 MCG: 1 TABLET ORAL at 10:04

## 2020-10-04 RX ADMIN — IPRATROPIUM BROMIDE AND ALBUTEROL 1 PUFF: 20; 100 SPRAY, METERED RESPIRATORY (INHALATION) at 07:31

## 2020-10-04 RX ADMIN — BUSPIRONE HYDROCHLORIDE 5 MG: 10 TABLET ORAL at 10:00

## 2020-10-04 RX ADMIN — MAGNESIUM HYDROXIDE 30 ML: 400 SUSPENSION ORAL at 13:20

## 2020-10-04 RX ADMIN — BUSPIRONE HYDROCHLORIDE 5 MG: 10 TABLET ORAL at 13:20

## 2020-10-04 RX ADMIN — MULTIPLE VITAMINS W/ MINERALS TAB 1 TABLET: TAB at 10:04

## 2020-10-04 RX ADMIN — PANTOPRAZOLE SODIUM 40 MG: 40 TABLET, DELAYED RELEASE ORAL at 10:05

## 2020-10-04 RX ADMIN — Medication 400 MG: at 10:00

## 2020-10-04 RX ADMIN — TAMSULOSIN HYDROCHLORIDE 0.4 MG: 0.4 CAPSULE ORAL at 10:03

## 2020-10-04 RX ADMIN — POTASSIUM CHLORIDE 10 MEQ: 1.5 POWDER, FOR SOLUTION ORAL at 10:05

## 2020-10-04 ASSESSMENT — PAIN SCALES - GENERAL: PAINLEVEL_OUTOF10: 0

## 2020-10-04 NOTE — PROGRESS NOTES
Hospitalist Progress Note      PCP: Iris Zayas MD    Date of Admission: 10/1/2020    Chief Complaint:  afebrile, stable HD, feels short of breath with minimal activity, on HFNC 60%    Medications:  Reviewed    Infusion Medications   Scheduled Medications    busPIRone  5 mg Oral TID    aspirin  81 mg Oral Daily    atorvastatin  20 mg Oral Daily    FLUoxetine  60 mg Oral Daily    folic acid  0,075 mcg Oral Daily    furosemide  40 mg Oral BID    losartan  100 mg Oral Daily    magnesium oxide  400 mg Oral Daily    metoprolol tartrate  50 mg Oral BID    therapeutic multivitamin-minerals  1 tablet Oral Daily    nicotine  1 patch Transdermal Q24H    Nintedanib Esylate  150 mg Oral BID    pantoprazole  40 mg Oral Daily    potassium chloride  10 mEq Oral Daily    spironolactone  50 mg Oral Daily    tamsulosin  0.4 mg Oral Daily    predniSONE  40 mg Oral Daily    albuterol-ipratropium  1 puff Inhalation TID     PRN Meds: magnesium hydroxide, acetaminophen, LORazepam, promethazine **OR** ondansetron, albuterol sulfate HFA, melatonin      Intake/Output Summary (Last 24 hours) at 10/4/2020 1238  Last data filed at 10/4/2020 0156  Gross per 24 hour   Intake --   Output 1700 ml   Net -1700 ml       Exam:    BP (!) 94/57   Pulse 63   Temp 97.7 °F (36.5 °C) (Oral)   Resp 18   Wt 206 lb 9.1 oz (93.7 kg)   SpO2 100%   BMI 31.41 kg/m²     General appearance: appears stated age and cooperative. Respiratory:  Diminished BS with crackles bilaterally. Cardiovascular: S1/S2, tachycardic  Abdomen: Soft, active bowel sounds. Musculoskeletal: No edema bilaterally.         Labs:   Recent Labs     10/02/20  0634 10/03/20  0850 10/04/20  0636   WBC 14.5* 16.7* 13.1*   HGB 12.2* 11.9* 11.5*   HCT 34.7* 35.4* 32.8*    232 225     Recent Labs     10/02/20  0634 10/03/20  0850 10/04/20  0636    136 134*   K 4.1 4.2 4.1   CL 95 96 96   CO2 23 27 26   BUN 39* 47* 42*   CREATININE 1.80* 1.70* 1.36*   CALCIUM 10.0* 10.1* 10.0*   PHOS 4.1 4.3 4.3     No results for input(s): AST, ALT, BILIDIR, BILITOT, ALKPHOS in the last 72 hours. No results for input(s): INR in the last 72 hours. No results for input(s): Towana Ball in the last 72 hours. Urinalysis:      Lab Results   Component Value Date    NITRU Negative 10/01/2020    BLOODU Negative 10/01/2020    SPECGRAV 1.020 10/01/2020    GLUCOSEU Negative 10/01/2020       Radiology:  CT CHEST WO CONTRAST   Final Result      Imaging findings consistent with UIP pattern of interstitial lung disease. US RETROPERITONEAL LIMITED   Final Result   PROBABLE 1 CM LEFT LEFT RENAL CYST. THERE  MILD INCREASED ECHOGENICITY OF THE KIDNEYS.                     Assessment/Plan:    62 y/o man NH resident with history of chronic hypoxic respiratory failure, IPF on 6 L of O2, active tobacco use, alcohol use, liver cirrhosis who presented with:    Acute / chronic hypoxic respiratory failure  - likely due to acute excerbation of IPF  - on IV Solumderol, duonebs,   - requiring HFNC 50-60%  - resumed Nintedanib  - will be transferred to [facility] for further management per critical care/pulmonology service    CKD3  - monitor renal function    Lactic acidosis  - resolved    Tobacco use   - advised to quit  - nicotine patch    HTN  - continue home meds    Anxiety / depression  - continue home meds      Diet: DIET GENERAL;    Code Status: DNR-CCA      Disposition - accepted to [facility], waiting for bed          Electronically signed by Trish José MD on 10/4/2020 at 12:38 PM

## 2020-10-04 NOTE — PROGRESS NOTES
[facility] EMERGENCY [facility] AT [facility] Respiratory Therapy Evaluation   Current Order: combivent tid mdi and albuterol mdi q2prn    Home Regimen: none     Ordering Physician: Ashish Flor  Re-evaluation Date:  10/7   Diagnosis:pulm fibrosis  Patient Status: Stable / Unstable + Physician notified    The following MDI Criteria must be met in order to convert aerosol to MDI with spacer. If unable to meet, MDI will be converted to aerosol:  []  Patient able to demonstrate the ability to use MDI effectively  []  Patient alert and cooperative  []  Patient able to take deep breath with 5-10 second hold  []  Medication(s) available in this delivery method   []  Peak flow greater than or equal to 200 ml/min            Current Order Substituted To  (same drug, same frequency)   Aerosol to MDI [] Albuterol Sulfate 0.083% unit dose by aerosol Albuterol Sulfate MDI 2 puffs by inhalation with spacer    [] Levalbuterol 1.25 mg unit dose by aerosol Levalbuterol MDI 2 puffs by inhalation with spacer    [] Levalbuterol 0.63 mg unit dose by aerosol Levalbuterol MDI 2 puffs by inhalation with spacer    [] Ipratropium Bromide 0.02% unit dose by aerosol Ipratropium Bromide MDI 2 puffs by inhalation with spacer    [] Duoneb (Ipratropium + Albuterol) unit dose by aerosol Ipratropium MDI + Albuterol MDI 2 puffs by inhalation w/spacer   MDI to Aerosol [] Albuterol Sulfate MDI Albuterol Sulfate 0.083% unit dose by aerosol    [] Levalbuterol MDI 2 puffs by inhalation Levalbuterol 1.25 mg unit dose by aerosol    [] Ipratropium Bromide MDI by inhalation Ipratropium Bromide 0.02% unit dose by aerosol    [] Combivent (Ipratropium + Albuterol) MDI by inhalation Duoneb (Ipratropium + Albuterol) unit dose by aerosol   Treatment Assessment [Frequency/Schedule]:  Change frequency to: ____no changes______________________________________________per Protocol, P&T, MEC      Points 0 1 2 3 4   Pulmonary Status  Non-Smoker  []   Smoking history   < 20 pack years  []   Smoking history  ?  21 pack years  []   Pulmonary Disorder  (acute or chronic)  [x]   Severe or Chronic w/ Exacerbation  []     Surgical Status No [x]   Surgeries     General []   Surgery Lower []   Abdominal Thoracic or []   Upper Abdominal Thoracic with  PulmonaryDisorder  []     Chest X-ray Clear/Not  Ordered     [x]  Chronic Changes  Results Pending  []  Infiltrates, atelectasis, pleural effusion, or edema  []  Infiltrates in more than one lobe []  Infiltrate + Atelectasis, &/or pleural effusion  []    Respiratory Pattern Regular,  RR = 12-20 [x]  Increased,  RR = 21-25 []  BONE, irregular,  or RR = 26-30 []  Decreased FEV1  or RR = 31-35 []  Severe SOB, use  of accessory muscles, or RR ? 35  []    Mental Status Alert, oriented,  Cooperative [x]  Confused but Follows commands []  Lethargic or unable to follow commands []  Obtunded  []  Comatose  []    Breath Sounds Clear to  auscultation  []  Decreased unilaterally or  in bases only []  Decreased  bilaterally  [x]  Crackles or intermittent wheezes []  Wheezes []    Cough Strong, Spontan., & nonproductive [x]  Strong,  spontaneous, &  productive []  Weak,  Nonproductive []  Weak, productive or  with wheezes []  No spontaneous  cough or may require suctioning []    Level of Activity Ambulatory []  Ambulatory w/ Assist  [x]  Non-ambulatory []  Paraplegic []  Quadriplegic []    Total    Score:____6___     Triage Score:____4____      Tri       Triage:     1. (>20) Freq: Q3    2. (16-20) Freq: Q4   3. (11-15) Freq: QID & Albuterol Q2 PRN    4. (6-10) Freq: TID & Albuterol Q2 PRN    5. (0-5) Freq Q4prn

## 2020-10-04 NOTE — PROGRESS NOTES
Assessment completed this shift. Alert and oriented x4. Denies pain or nausea. Lungs diminished. BS present. Dyspnea with minimal exertion. Spoke with Parish Carbajal from [facility] and gave report. Plan for transfer to [facility] later today.   Electronically signed by Patricia Regalado RN on 10/4/2020 at 2:34 PM

## 2020-10-04 NOTE — PLAN OF CARE
Problem: Falls - Risk of:  Goal: Will remain free from falls  Description: Will remain free from falls  Outcome: Ongoing  Goal: Absence of physical injury  Description: Absence of physical injury  Outcome: Ongoing     Problem: Skin Integrity:  Goal: Will show no infection signs and symptoms  Description: Will show no infection signs and symptoms  Outcome: Ongoing  Goal: Absence of new skin breakdown  Description: Absence of new skin breakdown  Outcome: Ongoing     Problem: Gas Exchange - Impaired:  Goal: Levels of oxygenation will improve  Description: Levels of oxygenation will improve  Outcome: Ongoing

## 2020-10-04 NOTE — CARE COORDINATION
MET WITH PATIENT, FREEDOM OF CHOICE OFFERED. PATIENT STATES HE WANTS TRANSFER TO [facility]. NOTIFIED DR. Moody Nava.

## 2020-10-04 NOTE — DISCHARGE SUMMARY
Hospital Medicine Discharge Summary    Ellie Maguire  :  1960  MRN:  [id number]    Admit date:  10/1/2020  Discharge date:  10/4/2020    Admitting Physician:  Michelle Mccormack DO  Primary Care Physician:  Simon Carias MD      Discharge Diagnoses: Active Problems:    Acute respiratory failure (HCC)    Goals of care, counseling/discussion  Resolved Problems:    * No resolved hospital problems. *      Hospital Course:   Ellie Maguire is a 61 y.o. male that was admitted and treated at [facility] for the following medical issues:     Acute / chronic hypoxic respiratory failure  - due to acute excerbation of IPF  - treated with on IV Solumedrol, duonebs,   - requiring HFNC 50-60%  - resumed Nintedanib  - transferred to [facility] for further management per critical care/pulmonology service recommendation       Patient was seen by the following consultants while admitted to [facility]:   Consults:  [address] [place] [address] Place TO PULMONOLOGY  IP CONSULT TO PALLIATIVE CARE    Significant Diagnostic Studies:    Xr Chest Portable    Result Date: 10/1/2020  XR CHEST PORTABLE : 10/1/2020 CLINICAL HISTORY:  sob . COMPARISON: 2020. TECHNIQUE: An upright portable AP radiograph of the chest was obtained. FINDINGS: Shallow inspiratory volumes are present with moderate predominantly peripheral coarsening of the bronchovascular structures suggestive of interstitial lung disease, not significantly changed from 2020. Superimposed interstitial edema, atypical pneumonia, or other less likely possibilities should be considered. There is no cardiomegaly, significant pleural effusion, gross vascular congestion, pneumothorax, or displaced fractures identified. STABLE CHEST FROM 2020 SUGGESTING MODERATE INTERSTITIAL LUNG DISEASE SUCH AS UIP. INTERSTITIAL EDEMA, ATYPICAL PNEUMONIA, OR OTHER LESS LIKELY POSSIBILITIES ARE ALSO CONSIDERATIONS. Us Retroperitoneal Limited    Result Date: 10/1/2020  EXAMINATION: US RETROPERITONEAL LIMITED DATE AND TIME:10/1/2020 12:52 PM CLINICAL HISTORY: HYDRONEPHROSIS. JESSICA  COMPARISONS:  NONE FINDINGS: Right kidney 10.4 cm. Left kidney. 11.2 cm. Echogenicity The echogenicity of renal cortex may be slightly increased but still is not greater than the liver. Hydronephrosis There is no hydronephrosis. Mass: 1 x 1 cm exophytic hypoechoic lesion projecting off the lower pole of the left kidney favoring a small cyst. Consider correlation with CT. If no CT is performed, a 6 month follow-up ultrasound recommended. PROBABLE 1 CM LEFT LEFT RENAL CYST. THERE  MILD INCREASED ECHOGENICITY OF THE KIDNEYS.        Disposition: transferred to [facility]       Signed:  Keisha Chávez  10/4/2020, 2:52 PM  ----------------------------------------------------------------------------------------------------------------------

## 2020-10-04 NOTE — PROGRESS NOTES
Pt is in bed watching TV. Assessment documented. Medicated per MAR. BP med help r/t order parameters, /69 at this time. VSS. Pt states that O2 temp is too high, RT on the floor, had RT speak w/ pt about adjusting the temp. Snacks given per pt request. No needs at this time. Fall prevention in place. Will continue to monitor. Electronically signed by Stefani Agosto RN on 10/3/2020 at 8:51 PM

## 2020-10-04 NOTE — PROGRESS NOTES
INPATIENT PROGRESS NOTES    PATIENT NAME: Dalila Mello  MRN: [id number]  SERVICE DATE:  October 4, 2020   SERVICE TIME:  1:32 PM      PRIMARY SERVICE: Pulmonary Disease    CHIEF COMPLAINTS: Shortness of breath    INTERVAL HPI: Patient seen and examined at bedside, Interval Notes, orders reviewed. Nursing notes noted    Patient feels better on high flow oxygen currently on 40 L 100%, he did not try to get up and walk yet, no chest pain, no coughing, no lower extremity edema, no fever no chills. Review of system:     GI Abdominal pain No  Skin Rash No    Social History     Tobacco Use    Smoking status: Current Some Day Smoker    Smokeless tobacco: Never Used    Tobacco comment: 3 small puff of cig a day   Substance Use Topics    Alcohol use: Not Currently     Comment: Since March 2020     No family history on file. OBJECTIVE    Body mass index is 31.41 kg/m². PHYSICAL EXAM:  Vitals:  BP (!) 94/57   Pulse 63   Temp 97.7 °F (36.5 °C) (Oral)   Resp 18   Wt 206 lb 9.1 oz (93.7 kg)   SpO2 100%   BMI 31.41 kg/m²     General: alert, cooperative, no distress  Head: normocephalic, atraumatic  Eyes:No gross abnormalities. ENT:  MMM no lesions  Neck:  supple and no masses  Chest bilateral rales, no wheezing, nontender, tympanic  Heart[de-identified] Heart sounds are normal.  Regular rate and rhythm without murmur, gallop or rub. ABD:  symmetric, soft, non-tender  Musculoskeletal : no cyanosis, no clubbing and no edema  Neuro:  Grossly normal  Skin: No rashes or nodules noted.   Lymph node:  no cervical nodes  Urology: No Boyd   Psychiatric: appropriate    DATA:   Recent Labs     10/03/20  0850 10/04/20  0636   WBC 16.7* 13.1*   HGB 11.9* 11.5*   HCT 35.4* 32.8*   MCV 93.1 91.4    225     Recent Labs     10/03/20  0850 10/04/20  0636    134*   K 4.2 4.1   CL 96 96   CO2 27 26   BUN 47* 42*   CREATININE 1.70* 1.36*   GLUCOSE 129* 93   CALCIUM 10.1* 10.0*   LABALBU 3.7 3.8   LABGLOM 41.2* 53.3*   GFRAA 49.9* >60.0       MV Settings:     FiO2 : 50 %    No results for input(s): PHART, XQP7VZH, PO2ART, IZX3GJM, BEART, X5PFHWXJ in the last 72 hours. O2 Device: Heated high flow cannula  O2 Flow Rate (L/min): 40 L/min    DIET GENERAL;     MEDICATIONS during current hospitalization:    Continuous Infusions:    Scheduled Meds:   busPIRone  5 mg Oral TID    aspirin  81 mg Oral Daily    atorvastatin  20 mg Oral Daily    FLUoxetine  60 mg Oral Daily    folic acid  6,243 mcg Oral Daily    furosemide  40 mg Oral BID    losartan  100 mg Oral Daily    magnesium oxide  400 mg Oral Daily    metoprolol tartrate  50 mg Oral BID    therapeutic multivitamin-minerals  1 tablet Oral Daily    nicotine  1 patch Transdermal Q24H    Nintedanib Esylate  150 mg Oral BID    pantoprazole  40 mg Oral Daily    potassium chloride  10 mEq Oral Daily    spironolactone  50 mg Oral Daily    tamsulosin  0.4 mg Oral Daily    predniSONE  40 mg Oral Daily    albuterol-ipratropium  1 puff Inhalation TID       PRN Meds:magnesium hydroxide, acetaminophen, LORazepam, promethazine **OR** ondansetron, albuterol sulfate HFA, melatonin    Radiology  Xr Chest Portable    Result Date: 10/1/2020  XR CHEST PORTABLE : 10/1/2020 CLINICAL HISTORY:  sob . COMPARISON: 9/12/2020. TECHNIQUE: An upright portable AP radiograph of the chest was obtained. FINDINGS: Shallow inspiratory volumes are present with moderate predominantly peripheral coarsening of the bronchovascular structures suggestive of interstitial lung disease, not significantly changed from 9/12/2020. Superimposed interstitial edema, atypical pneumonia, or other less likely possibilities should be considered. There is no cardiomegaly, significant pleural effusion, gross vascular congestion, pneumothorax, or displaced fractures identified. STABLE CHEST FROM 9/12/2020 SUGGESTING MODERATE INTERSTITIAL LUNG DISEASE SUCH AS UIP.  INTERSTITIAL EDEMA, ATYPICAL PNEUMONIA, OR OTHER LESS LIKELY POSSIBILITIES ARE ALSO CONSIDERATIONS. Xr Chest Portable    Result Date: 9/12/2020  EXAMINATION: CHEST PORTABLE VIEW  CLINICAL HISTORY: Short of breath COMPARISONS: None  FINDINGS: Single  views of the chest is submitted. The cardiac silhouette is enlarged . Pulmonary vascular congested with increased interstitial markings. Right sided trachea. There is patchy multifocal to coalescent airspace disease throughout the lung parenchyma. .  No Pneumothoraces. QUESTION OF PULMONARY VASCULAR CONGESTED WITH INCREASED INTERSTITIAL MARKINGS VERSUS CHRONIC INTERSTITIAL CHANGES. Heddy  QUESTION OF PATCHY MULTIFOCAL TO COALESCENT AIRSPACE DISEASE THROUGHOUT SUPERIMPOSED UPON THE LUNG PARENCHYMA. ... THE CONSTELLATION OF FINDINGS RECOMMEND CT SCAN THE CHEST WITHOUT IV CONTRAST TO FURTHER EVALUATE    Us Retroperitoneal Limited    Result Date: 10/1/2020  EXAMINATION: US RETROPERITONEAL LIMITED DATE AND TIME:10/1/2020 12:52 PM CLINICAL HISTORY: HYDRONEPHROSIS. JESSICA  COMPARISONS:  NONE FINDINGS: Right kidney 10.4 cm. Left kidney. 11.2 cm. Echogenicity The echogenicity of renal cortex may be slightly increased but still is not greater than the liver. Hydronephrosis There is no hydronephrosis. Mass: 1 x 1 cm exophytic hypoechoic lesion projecting off the lower pole of the left kidney favoring a small cyst. Consider correlation with CT. If no CT is performed, a 6 month follow-up ultrasound recommended. PROBABLE 1 CM LEFT LEFT RENAL CYST. THERE  MILD INCREASED ECHOGENICITY OF THE KIDNEYS.        CT chest shows severe fibrosis with hazy groundglass areas      IMPRESSION AND SUGGESTION:  Patient is at risk due to   · Acute on chronic hypoxic respiratory failure secondary to  · Underlying severe IPF with progression  · Presyncopal event likely due to severe hypoxia on exertion    Recommendations    · In light of areas of hazy groundglass changes will change to Solu-Medrol 40 3 times daily  · Probably will benefit from transferring to tertiary center in light of rapid progression of his disease to be evaluated by lung transplant team  ·  would benefit from bronchoscopy and BAL, does not seem he was on chronic steroid before coming here, per patient he was tapered off. However PJP needs to be ruled out  · Resume home Ofev  · O2 to keep sat 90 to 92%, will use high flow oxygen  · Bedside commode  · Echo, pending     DW Dr Ari Stiles     I spent 30 min with this patient, greater the 50% of this time was spent in counseling and/or coordinating of care.       Electronically signed by Meghan Galvez MD,  FCCP   on 10/4/2020 at 1:32 PM

## 2020-10-04 NOTE — PROGRESS NOTES
Pt to transfer to [facility] MICU bed #14. Report called to Chris Bean RN. Critical Transport here now to transport patient.   Electronically signed by Octavio Rnedon RN on 10/4/2020 at 3:26 PM

## 2020-10-06 LAB
BLOOD CULTURE, ROUTINE: NORMAL
CULTURE, BLOOD 2: NORMAL